# Patient Record
(demographics unavailable — no encounter records)

---

## 2024-10-15 NOTE — END OF VISIT
[Time Spent: ___ minutes] : I have spent [unfilled] minutes of time on the encounter which excludes teaching and separately reported services.
2 to 4 times a month

## 2024-10-18 NOTE — ASSESSMENT
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  Other high risk features such as LVI or PNI were not reported.  I had an extensive discussion about the role of adjuvant chemotherapy and stage II colon cancer I explained that at most adjuvant chemotherapy improve survival by about 2 to 4%.  He does have high risk clinical features granted these alone does not guarantee that there will be benefit from adjuvant chemotherapy  We went over the following options which include observation, adjuvant chemotherapy with capecitabine for 6 months, I explained the uncertain role of oxaliplatin and stage II colon cancer and that there is no definitive data that did improve survival, another option is discussed was a clinical trial NRG-.  Although this trial is currently not available in [place] it is available and multiple [facility] centers including [facility].  I also discussed the role of minimal residual disease testing with circulating tumor DNA analasis to guide therapy. N Engl J Med 2022;386:6800-1989 DOI: 10.1056/[id number] and the mentation of this technology was also discussed with patient  I went over the side effects of capecitabine in detail including but not limited to nausea, vomiting, rashes, diarrhea, cytopenia and severe toxicity such as mucositis and pancytopenia if his DPD deficiency.  After an extensive discussion he favored to start capecitabine for 6 months and also wanted to obtain [employer]ater[employer] testing if the result is positive we would escalate treatment and add oxaliplatin and complete 6 months of total treatment.  He started single agent Capecitabine  Plan -Capecitabine was ordered at a dose of 1000 mg/m twice a day day 1 through day 14 every 21 days, written information was provided, Zofran, Imodium and urea cream was provided and how to utilize it and side effect managements were discussed in detail -He has no complications with treatment and we will proceed with cycle 2 tomorrow and will see us back with cycle 3 -Blood work was done  -Signatera  After the visit actually came back and it was negative -Will need CT scan of the chest abdomen pelvis in approximately 1 year and repeat colonoscopy around the same time -CBC CMP with next visit will check CEA every 3 months

## 2024-10-18 NOTE — HISTORY OF PRESENT ILLNESS
[Disease: _____________________] : Disease: [unfilled] [T: ___] : T[unfilled] [N: ___] : N[unfilled] [M: ___] : M[unfilled] [AJCC Stage: ____] : AJCC Stage: [unfilled] [de-identified] : CC: I have colon cancer  He is here at the request of Dr. Aguilar  This is a 56 year old male with history of   HTN, HLD, cardiac catheterization, cardiomyopathy for reduced ejection fraction on echo with no acute findings, no need for stent placement, who presents at the recommendation of his PCP to [facility] ED on 8/19/2024  after an outpatient CT scan was done demonstrating an obstructing mass at the splenic flexure. Patient underwent CTAP due to GI discomfort for multiple months, not relieved with PRN GI medications. Patient states he has had intermittent constipation with decreased caliber of his stools.  He also had diarrhea and abdominal distension, upper abdomen. He follow with Dr. Martin for his PCP, GI Dr. Gray, Cardiology Dr. Mckeon.  He had CT C/A/P on 8/19/24in [facility]:  The patient's known constricting soft tissue mass is identified at level of the junction of the splenic flexure with the descending colon. There is marked dilatation of the cecum, ascending colon and transverse colon to the level of the obstructing lesion. The colon measures up to 11.5 cm in diameter. No CT evidence of metastasis.   He had a flex sig on 8/19/2024  with finding of descending colon mass s/p biopsy s/p tattoo distal to mass colonic stent placement. Biopsy showed:  Colon, splenic flexure, mass, biopsy: - Minute focus of high grade carcinoma, in a background of marked ulceration / necrosis       and fragments of colonic mucosa.  He had resection by Dr. Aguilar  /p robotic splenic flexure colectomy, Path: Final Diagnosis 1. Middle colic lymph node:  -  1 lymph node, negative for tumor.  2.  Middle colic the lymphadenectomy: -  12 lymph nodes, negative for tumor.  3.  Fourth portion of duodenum serosa: -  Fibrofatty tissue with mild chronic inflammation. -  Negative for tumor.  4. Splenic flexure colectomy: -  Poorly differentiated, ulcerated adenocarcinoma with mucin production. -  Tumor invades muscle and pericolic tissue, pT3. -  20 lymph nodes are free of tumor. -  All resection margins are negative for tumor. -  Calretinin stain is negative in tumor with rules out medullary carcinoma.   Immunohistochemical studies were performed at [facility], [address] [place] [address], [facility] (see NOTE).  The results are as follows:  Immunohistochemical stains have been performed using antibodies to the following mismatch repair proteins:  hMLH1 (clone M1)   Positive  hMSH2 (clone [id number])    Positive  hMSH6 (clone SP93)     Positive  PMS2 (clone A16-4)     Positive    Review of his blood work showed borderline mild anemia he had leukocytosis, CMP had some SVITLANA which resolved LFTs were normal his CA 19-9 and CEA were normal as well this is from August 20.  He is here for to discuss adjuvant options.     [de-identified] : Adenocarcinoma  [de-identified] : October 15, 2024 Guero is here for follow-up he started capecitabine and is due for cycle 2 tomorrow he has no complications CBC came back demonstrating mild anemia with hemoglobin 11.5 may be from chemotherapy but I ordered iron studies as well today

## 2024-10-18 NOTE — ASSESSMENT
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  Other high risk features such as LVI or PNI were not reported.  I had an extensive discussion about the role of adjuvant chemotherapy and stage II colon cancer I explained that at most adjuvant chemotherapy improve survival by about 2 to 4%.  He does have high risk clinical features granted these alone does not guarantee that there will be benefit from adjuvant chemotherapy  We went over the following options which include observation, adjuvant chemotherapy with capecitabine for 6 months, I explained the uncertain role of oxaliplatin and stage II colon cancer and that there is no definitive data that did improve survival, another option is discussed was a clinical trial NRG-.  Although this trial is currently not available in [place] it is available and multiple [facility] centers including [facility].  I also discussed the role of minimal residual disease testing with circulating tumor DNA analasis to guide therapy. N Engl J Med 2022;386:5022-2655 DOI: 10.1056/[id number] and the mentation of this technology was also discussed with patient  I went over the side effects of capecitabine in detail including but not limited to nausea, vomiting, rashes, diarrhea, cytopenia and severe toxicity such as mucositis and pancytopenia if his DPD deficiency.  After an extensive discussion he favored to start capecitabine for 6 months and also wanted to obtain [employer] testing if the result is positive we would escalate treatment and add oxaliplatin and complete 6 months of total treatment.  He started single agent Capecitabine  Plan -Capecitabine was ordered at a dose of 1000 mg/m twice a day day 1 through day 14 every 21 days, written information was provided, Zofran, Imodium and urea cream was provided and how to utilize it and side effect managements were discussed in detail -He has no complications with treatment and we will proceed with cycle 2 tomorrow and will see us back with cycle 3 -Blood work was done  -Signatera  After the visit actually came back and it was negative -Will need CT scan of the chest abdomen pelvis in approximately 1 year and repeat colonoscopy around the same time -CBC CMP with next visit will check CEA every 3 months

## 2024-10-18 NOTE — PHYSICAL EXAM
[Fully active, able to carry on all pre-disease performance without restriction] : Status 0 - Fully active, able to carry on all pre-disease performance without restriction [Normal] : affect appropriate [de-identified] : He has well-healed recent surgical scars abdomen was soft no evidence of infection

## 2024-10-18 NOTE — ASSESSMENT
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  Other high risk features such as LVI or PNI were not reported.  I had an extensive discussion about the role of adjuvant chemotherapy and stage II colon cancer I explained that at most adjuvant chemotherapy improve survival by about 2 to 4%.  He does have high risk clinical features granted these alone does not guarantee that there will be benefit from adjuvant chemotherapy  We went over the following options which include observation, adjuvant chemotherapy with capecitabine for 6 months, I explained the uncertain role of oxaliplatin and stage II colon cancer and that there is no definitive data that did improve survival, another option is discussed was a clinical trial NRG-.  Although this trial is currently not available in [place] it is available and multiple [facility] centers including [facility].  I also discussed the role of minimal residual disease testing with circulating tumor DNA analasis to guide therapy. N Engl J Med 2022;386:9316-5894 DOI: 10.1056/[id number] and the mentation of this technology was also discussed with patient  I went over the side effects of capecitabine in detail including but not limited to nausea, vomiting, rashes, diarrhea, cytopenia and severe toxicity such as mucositis and pancytopenia if his DPD deficiency.  After an extensive discussion he favored to start capecitabine for 6 months and also wanted to obtain [employer] testing if the result is positive we would escalate treatment and add oxaliplatin and complete 6 months of total treatment.  He started single agent Capecitabine  Plan -Capecitabine was ordered at a dose of 1000 mg/m twice a day day 1 through day 14 every 21 days, written information was provided, Zofran, Imodium and urea cream was provided and how to utilize it and side effect managements were discussed in detail -He has no complications with treatment and we will proceed with cycle 2 tomorrow and will see us back with cycle 3 -Blood work was done  -Signatera  After the visit actually came back and it was negative -Will need CT scan of the chest abdomen pelvis in approximately 1 year and repeat colonoscopy around the same time -CBC CMP with next visit will check CEA every 3 months

## 2024-10-18 NOTE — PHYSICAL EXAM
[Fully active, able to carry on all pre-disease performance without restriction] : Status 0 - Fully active, able to carry on all pre-disease performance without restriction [Normal] : affect appropriate [de-identified] : He has well-healed recent surgical scars abdomen was soft no evidence of infection

## 2024-10-18 NOTE — ASSESSMENT
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  Other high risk features such as LVI or PNI were not reported.  I had an extensive discussion about the role of adjuvant chemotherapy and stage II colon cancer I explained that at most adjuvant chemotherapy improve survival by about 2 to 4%.  He does have high risk clinical features granted these alone does not guarantee that there will be benefit from adjuvant chemotherapy  We went over the following options which include observation, adjuvant chemotherapy with capecitabine for 6 months, I explained the uncertain role of oxaliplatin and stage II colon cancer and that there is no definitive data that did improve survival, another option is discussed was a clinical trial NRG-.  Although this trial is currently not available in [place] it is available and multiple [facility] centers including [facility].  I also discussed the role of minimal residual disease testing with circulating tumor DNA analasis to guide therapy. N Engl J Med 2022;386:4393-3609 DOI: 10.1056/[id number] and the mentation of this technology was also discussed with patient  I went over the side effects of capecitabine in detail including but not limited to nausea, vomiting, rashes, diarrhea, cytopenia and severe toxicity such as mucositis and pancytopenia if his DPD deficiency.  After an extensive discussion he favored to start capecitabine for 6 months and also wanted to obtain [employer] testing if the result is positive we would escalate treatment and add oxaliplatin and complete 6 months of total treatment.  He started single agent Capecitabine  Plan -Capecitabine was ordered at a dose of 1000 mg/m twice a day day 1 through day 14 every 21 days, written information was provided, Zofran, Imodium and urea cream was provided and how to utilize it and side effect managements were discussed in detail -He has no complications with treatment and we will proceed with cycle 2 tomorrow and will see us back with cycle 3 -Blood work was done  -Signatera  After the visit actually came back and it was negative -Will need CT scan of the chest abdomen pelvis in approximately 1 year and repeat colonoscopy around the same time -CBC CMP with next visit will check CEA every 3 months

## 2024-10-18 NOTE — BEGINNING OF VISIT
[0] : 2) Feeling down, depressed, or hopeless: Not at all (0) [PHQ-2 Negative] : PHQ-2 Negative [Pain Scale: ___] : On a scale of 1-10, today the patient's pain is a(n) [unfilled]. [Never] : Never [Date Discussed (MM/DD/YY): ___] : Discussed: [unfilled] [With Patient/Caregiver] : with Patient/Caregiver [Diarrhea Character] : Diarrhea: Grade 0

## 2024-10-18 NOTE — PHYSICAL EXAM
[Fully active, able to carry on all pre-disease performance without restriction] : Status 0 - Fully active, able to carry on all pre-disease performance without restriction [Normal] : affect appropriate [de-identified] : He has well-healed recent surgical scars abdomen was soft no evidence of infection

## 2024-10-18 NOTE — HISTORY OF PRESENT ILLNESS
[Disease: _____________________] : Disease: [unfilled] [T: ___] : T[unfilled] [N: ___] : N[unfilled] [M: ___] : M[unfilled] [AJCC Stage: ____] : AJCC Stage: [unfilled] [de-identified] : CC: I have colon cancer  He is here at the request of Dr. Aguilar  This is a 56 year old male with history of   HTN, HLD, cardiac catheterization, cardiomyopathy for reduced ejection fraction on echo with no acute findings, no need for stent placement, who presents at the recommendation of his PCP to [facility] ED on 8/19/2024  after an outpatient CT scan was done demonstrating an obstructing mass at the splenic flexure. Patient underwent CTAP due to GI discomfort for multiple months, not relieved with PRN GI medications. Patient states he has had intermittent constipation with decreased caliber of his stools.  He also had diarrhea and abdominal distension, upper abdomen. He follow with Dr. Martin for his PCP, GI Dr. Gray, Cardiology Dr. Mckeon.  He had CT C/A/P on 8/19/24in [facility]:  The patient's known constricting soft tissue mass is identified at level of the junction of the splenic flexure with the descending colon. There is marked dilatation of the cecum, ascending colon and transverse colon to the level of the obstructing lesion. The colon measures up to 11.5 cm in diameter. No CT evidence of metastasis.   He had a flex sig on 8/19/2024  with finding of descending colon mass s/p biopsy s/p tattoo distal to mass colonic stent placement. Biopsy showed:  Colon, splenic flexure, mass, biopsy: - Minute focus of high grade carcinoma, in a background of marked ulceration / necrosis       and fragments of colonic mucosa.  He had resection by Dr. Aguilar  /p robotic splenic flexure colectomy, Path: Final Diagnosis 1. Middle colic lymph node:  -  1 lymph node, negative for tumor.  2.  Middle colic the lymphadenectomy: -  12 lymph nodes, negative for tumor.  3.  Fourth portion of duodenum serosa: -  Fibrofatty tissue with mild chronic inflammation. -  Negative for tumor.  4. Splenic flexure colectomy: -  Poorly differentiated, ulcerated adenocarcinoma with mucin production. -  Tumor invades muscle and pericolic tissue, pT3. -  20 lymph nodes are free of tumor. -  All resection margins are negative for tumor. -  Calretinin stain is negative in tumor with rules out medullary carcinoma.   Immunohistochemical studies were performed at [facility], [address] [place] [address], [facility] (see NOTE).  The results are as follows:  Immunohistochemical stains have been performed using antibodies to the following mismatch repair proteins:  hMLH1 (clone M1)   Positive  hMSH2 (clone [id number])    Positive  hMSH6 (clone SP93)     Positive  PMS2 (clone A16-4)     Positive    Review of his blood work showed borderline mild anemia he had leukocytosis, CMP had some SVITLANA which resolved LFTs were normal his CA 19-9 and CEA were normal as well this is from August 20.  He is here for to discuss adjuvant options.     [de-identified] : Adenocarcinoma  [de-identified] : October 15, 2024 Guero is here for follow-up he started capecitabine and is due for cycle 2 tomorrow he has no complications CBC came back demonstrating mild anemia with hemoglobin 11.5 may be from chemotherapy but I ordered iron studies as well today

## 2024-10-18 NOTE — HISTORY OF PRESENT ILLNESS
[Disease: _____________________] : Disease: [unfilled] [T: ___] : T[unfilled] [N: ___] : N[unfilled] [M: ___] : M[unfilled] [AJCC Stage: ____] : AJCC Stage: [unfilled] [de-identified] : CC: I have colon cancer  He is here at the request of Dr. Aguilar  This is a 56 year old male with history of   HTN, HLD, cardiac catheterization, cardiomyopathy for reduced ejection fraction on echo with no acute findings, no need for stent placement, who presents at the recommendation of his PCP to [facility] ED on 8/19/2024  after an outpatient CT scan was done demonstrating an obstructing mass at the splenic flexure. Patient underwent CTAP due to GI discomfort for multiple months, not relieved with PRN GI medications. Patient states he has had intermittent constipation with decreased caliber of his stools.  He also had diarrhea and abdominal distension, upper abdomen. He follow with Dr. Martin for his PCP, GI Dr. Gray, Cardiology Dr. Mckeon.  He had CT C/A[facility]P on 8/19/[facility]:  The patient's known constricting soft tissue mass is identified at level of the junction of the splenic flexure with the descending colon. There is marked dilatation of the cecum, ascending colon and transverse colon to the level of the obstructing lesion. The colon measures up to 11.5 cm in diameter. No CT evidence of metastasis.   He had a flex sig on 8/19/2024  with finding of descending colon mass s/p biopsy s/p tattoo distal to mass colonic stent placement. Biopsy showed:  Colon, splenic flexure, mass, biopsy: - Minute focus of high grade carcinoma, in a background of marked ulceration / necrosis       and fragments of colonic mucosa.  He had resection by Dr. Aguilar  /p robotic splenic flexure colectomy, Path: Final Diagnosis 1. Middle colic lymph node:  -  1 lymph node, negative for tumor.  2.  Middle colic the lymphadenectomy: -  12 lymph nodes, negative for tumor.  3.  Fourth portion of duodenum serosa: -  Fibrofatty tissue with mild chronic inflammation. -  Negative for tumor.  4. Splenic flexure colectomy: -  Poorly differentiated, ulcerated adenocarcinoma with mucin production. -  Tumor invades muscle and pericolic tissue, pT3. -  20 lymph nodes are free of tumor. -  All resection margins are negative for tumor. -  Calretinin stain is negative in tumor with rules out medullary carcinoma.   Immunohistochemical studies were performed at [facility], [address] [place] [address], [facility] (see NOTE).  The results are as follows:  Immunohistochemical stains have been performed using antibodies to the following mismatch repair proteins:  hMLH1 (clone M1)   Positive  hMSH2 (clone [id number])    Positive  hMSH6 (clone SP93)     Positive  PMS2 (clone A16-4)     Positive    Review of his blood work showed borderline mild anemia he had leukocytosis, CMP had some SVITLANA which resolved LFTs were normal his CA 19-9 and CEA were normal as well this is from August 20.  He is here for to discuss adjuvant options.     [de-identified] : Adenocarcinoma  [de-identified] : October 15, 2024 Guero is here for follow-up he started capecitabine and is due for cycle 2 tomorrow he has no complications CBC came back demonstrating mild anemia with hemoglobin 11.5 may be from chemotherapy but I ordered iron studies as well today

## 2024-10-18 NOTE — PHYSICAL EXAM
[Fully active, able to carry on all pre-disease performance without restriction] : Status 0 - Fully active, able to carry on all pre-disease performance without restriction [Normal] : affect appropriate [de-identified] : He has well-healed recent surgical scars abdomen was soft no evidence of infection

## 2024-10-18 NOTE — HISTORY OF PRESENT ILLNESS
[Disease: _____________________] : Disease: [unfilled] [T: ___] : T[unfilled] [N: ___] : N[unfilled] [M: ___] : M[unfilled] [AJCC Stage: ____] : AJCC Stage: [unfilled] [de-identified] : CC: I have colon cancer  He is here at the request of Dr. Aguilar  This is a 56 year old male with history of   HTN, HLD, cardiac catheterization, cardiomyopathy for reduced ejection fraction on echo with no acute findings, no need for stent placement, who presents at the recommendation of his PCP to [facility] ED on 8/19/2024  after an outpatient CT scan was done demonstrating an obstructing mass at the splenic flexure. Patient underwent CTAP due to GI discomfort for multiple months, not relieved with PRN GI medications. Patient states he has had intermittent constipation with decreased caliber of his stools.  He also had diarrhea and abdominal distension, upper abdomen. He follow with Dr. Martin for his PCP, GI Dr. Gray, Cardiology Dr. Mckeon.  He had CT C/A/P on 8/19/24in [facility]:  The patient's known constricting soft tissue mass is identified at level of the junction of the splenic flexure with the descending colon. There is marked dilatation of the cecum, ascending colon and transverse colon to the level of the obstructing lesion. The colon measures up to 11.5 cm in diameter. No CT evidence of metastasis.   He had a flex sig on 8/19/2024  with finding of descending colon mass s/p biopsy s/p tattoo distal to mass colonic stent placement. Biopsy showed:  Colon, splenic flexure, mass, biopsy: - Minute focus of high grade carcinoma, in a background of marked ulceration / necrosis       and fragments of colonic mucosa.  He had resection by Dr. Aguilar  /p robotic splenic flexure colectomy, Path: Final Diagnosis 1. Middle colic lymph node:  -  1 lymph node, negative for tumor.  2.  Middle colic the lymphadenectomy: -  12 lymph nodes, negative for tumor.  3.  Fourth portion of duodenum serosa: -  Fibrofatty tissue with mild chronic inflammation. -  Negative for tumor.  4. Splenic flexure colectomy: -  Poorly differentiated, ulcerated adenocarcinoma with mucin production. -  Tumor invades muscle and pericolic tissue, pT3. -  20 lymph nodes are free of tumor. -  All resection margins are negative for tumor. -  Calretinin stain is negative in tumor with rules out medullary carcinoma.   Immunohistochemical studies were performed at [facility], [address] [place] [address], [facility] (see NOTE).  The results are as follows:  Immunohistochemical stains have been performed using antibodies to the following mismatch repair proteins:  hMLH1 (clone M1)   Positive  hMSH2 (clone [id number])    Positive  hMSH6 (clone SP93)     Positive  PMS2 (clone A16-4)     Positive    Review of his blood work showed borderline mild anemia he had leukocytosis, CMP had some SVITLANA which resolved LFTs were normal his CA 19-9 and CEA were normal as well this is from August 20.  He is here for to discuss adjuvant options.     [de-identified] : Adenocarcinoma  [de-identified] : October 15, 2024 Guero is here for follow-up he started capecitabine and is due for cycle 2 tomorrow he has no complications CBC came back demonstrating mild anemia with hemoglobin 11.5 may be from chemotherapy but I ordered iron studies as well today

## 2024-11-15 NOTE — PHYSICAL EXAM
[Fully active, able to carry on all pre-disease performance without restriction] : Status 0 - Fully active, able to carry on all pre-disease performance without restriction [Normal] : affect appropriate [de-identified] : well-healed recent surgical scars abdomen was soft no evidence of infection

## 2024-11-15 NOTE — END OF VISIT
[FreeTextEntry3] : I was physically present for the key portions of the evaluation and management service provided.  I agree with the history and physical, and plan which I have reviewed and edited where appropriate.  Guero is here for follow-up he is on adjuvant capecitabine for stage II colon cancer he is doing well his postop Signatera was negative we will continue to monitor blood work will check CEA every 3 months he is to complete 8 cycles and is due for cycle 4 soon

## 2024-11-15 NOTE — HISTORY OF PRESENT ILLNESS
[Disease: _____________________] : Disease: [unfilled] [T: ___] : T[unfilled] [N: ___] : N[unfilled] [M: ___] : M[unfilled] [AJCC Stage: ____] : AJCC Stage: [unfilled] [de-identified] : CC: I have colon cancer  He is here at the request of Dr. Aguilar  This is a 56-year-old male with history of HTN, HLD, cardiac catheterization, cardiomyopathy for reduced ejection fraction on echo with no acute findings, no need for stent placement, who presents at the recommendation of his PCP to the ED on 8/19/2024 after an outpatient CT scan was done demonstrating an obstructing mass at the splenic flexure. Patient underwent CTAP due to GI discomfort for multiple months, not relieved with PRN GI medications. Patient states he has had intermittent constipation with decreased caliber of his stools. He also had diarrhea and abdominal distension, upper abdomen. He follows with Dr. Martin for his PCP, GI Dr. Gray, Cardiology Dr. Mckeon.  He had CT C/A/P on 8/19/24in [facility]: The patient's known constricting soft tissue mass is identified at level of the junction of the splenic flexure with the descending colon. There is marked dilatation of the cecum, ascending colon and transverse colon to the level of the obstructing lesion. The colon measures up to 11.5 cm in diameter. No CT evidence of metastasis.  He had a flex sig on 8/19/2024 with finding of descending colon mass s/p biopsy s/p tattoo distal to mass colonic stent placement.  Biopsy showed: Colon, splenic flexure, mass, biopsy: - Minute focus of high-grade carcinoma, in a background of marked ulceration / necrosis and fragments of colonic mucosa.  He had resection by Dr. Aguilar S/P robotic splenic flexure colectomy, Path: Final Diagnosis 1. Middle colic lymph node: -  1 lymph node, negative for tumor. 2.  Middle colic the lymphadenectomy: -  12 lymph nodes, negative for tumor. 3.  Fourth portion of duodenum serosa: -  Fibrofatty tissue with mild chronic inflammation. -  Negative for tumor. 4. Splenic flexure colectomy: -  Poorly differentiated, ulcerated adenocarcinoma with mucin production. -  Tumor invades muscle and pericolic tissue, pT3. -  20 lymph nodes are free of tumor. -  All resection margins are negative for tumor. -  Calretinin stain is negative in tumor with rules out medullary carcinoma.  Immunohistochemical studies were performed at [facility], [address] [place] [address], [id number] (see NOTE). Immunohistochemical stains have been performed using antibodies to the following mismatch repair proteins: hMLH1 (clone M1)   Positive hMSH2 (clone [id number])    Positive hMSH6 (clone SP93)     Positive PMS2 (clone A16-4)     Positive  Review of his blood work showed borderline mild anemia he had leukocytosis, CMP had some SVITLANA which resolved LFTs were normal his CA 19-9 and CEA were normal as well this is from August 20.  He is here for to discuss adjuvant options. [de-identified] : Adenocarcinoma  [de-identified] : October 15, 2024 Guero is here for follow-up he started capecitabine and is due for cycle 2 tomorrow he has no complications CBC came back demonstrating mild anemia with hemoglobin 11.5 may be from chemotherapy but I ordered iron studies as well today.  11/12/2024 He is here today for follow-up and reports no significant changes since the last visit.  He remains at the base of his health status.

## 2024-11-15 NOTE — PHYSICAL EXAM
[Fully active, able to carry on all pre-disease performance without restriction] : Status 0 - Fully active, able to carry on all pre-disease performance without restriction [Normal] : affect appropriate [de-identified] : well-healed recent surgical scars abdomen was soft no evidence of infection

## 2024-11-15 NOTE — PHYSICAL EXAM
[Fully active, able to carry on all pre-disease performance without restriction] : Status 0 - Fully active, able to carry on all pre-disease performance without restriction [Normal] : affect appropriate [de-identified] : well-healed recent surgical scars abdomen was soft no evidence of infection

## 2024-11-15 NOTE — ASSESSMENT
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 - 20 lymph nodes removed the tumor was poorly differentiated as well as there was evidence of obstruction. - Other high-risk features such as LVI or PNI were not reported. - I had an extensive discussion about the role of adjuvant chemotherapy and stage II colon cancer I explained that at most adjuvant chemotherapy improve survival by about 2 to 4%. He does have high risk clinical features granted these alone does not guarantee that there will be benefit from adjuvant chemotherapy. - We went over the following options which include observation, adjuvant chemotherapy with capecitabine for 6 months, I explained the uncertain role of oxaliplatin and stage II colon cancer and that there is no definitive data that did improve survival, another option is discussed was a clinical trial NRG-. Although this trial is currently not available in [place] it is available and multiple [facility] centers including [facility]. I also discussed the role of minimal residual disease testing with circulating tumor DNA analysis to guide therapy. N Engl J Med 2022;386:5764-8843 DOI: 10.1056/ACBYqg0142760 and the mentation of this technology was also discussed with patient. - I went over the side effects of capecitabine in detail including but not limited to nausea, vomiting, rashes, diarrhea, cytopenia and severe toxicity such as mucositis and pancytopenia if his DPD deficiency. - After an extensive discussion he favored to start capecitabine for 6 months and also wanted to obtain Signatera testing if the result is positive we would escalate treatment and add oxaliplatin and complete 6 months of total treatment. - Signatera  was negative. - 09/18/2024 started single agent Capecitabine 1000 mg/m2 BID D1-14 Q21D for total of 6 months.  11/12/2024 Labs reviewed and results discussed with the patient; He reports that high potassium was also noted by PCP at yesterday's blood work.  He is aware and wants to follow with PCP recommendations to avoid potassium rich food and has follow-up with PCP to manage hyperkalemia - remains clinically stable to continue current management. All questions were answered to satisfaction.  PLAN: - continue Capecitabine 1000 mg/m2 BID D1-14 Q21D for total of 6 months - he is on C3W2 today. - continue Zofran, Imodium and urea cream PRN. - repeat CT scan of the chest abdomen pelvis in approximately 1 year and repeat colonoscopy around the same time - 09/2025. - F/U with PCP to manage hyperkalemia. - Labs today: CBC CMP CEA every 3 months RTC 11/26/2024 with CBC CMP

## 2024-11-15 NOTE — ASSESSMENT
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 - 20 lymph nodes removed the tumor was poorly differentiated as well as there was evidence of obstruction. - Other high-risk features such as LVI or PNI were not reported. - I had an extensive discussion about the role of adjuvant chemotherapy and stage II colon cancer I explained that at most adjuvant chemotherapy improve survival by about 2 to 4%. He does have high risk clinical features granted these alone does not guarantee that there will be benefit from adjuvant chemotherapy. - We went over the following options which include observation, adjuvant chemotherapy with capecitabine for 6 months, I explained the uncertain role of oxaliplatin and stage II colon cancer and that there is no definitive data that did improve survival, another option is discussed was a clinical trial NRG-. Although this trial is currently not available in [place] it is available and multiple [facility] centers including [facility]. I also discussed the role of minimal residual disease testing with circulating tumor DNA analysis to guide therapy. N Engl J Med 2022;386:3793-9689 DOI: 10.1056/PMXIqv3163817 and the mentation of this technology was also discussed with patient. - I went over the side effects of capecitabine in detail including but not limited to nausea, vomiting, rashes, diarrhea, cytopenia and severe toxicity such as mucositis and pancytopenia if his DPD deficiency. - After an extensive discussion he favored to start capecitabine for 6 months and also wanted to obtain Signatera testing if the result is positive we would escalate treatment and add oxaliplatin and complete 6 months of total treatment. - Signatera  was negative. - 09/18/2024 started single agent Capecitabine 1000 mg/m2 BID D1-14 Q21D for total of 6 months.  11/12/2024 Labs reviewed and results discussed with the patient; He reports that high potassium was also noted by PCP at yesterday's blood work.  He is aware and wants to follow with PCP recommendations to avoid potassium rich food and has follow-up with PCP to manage hyperkalemia - remains clinically stable to continue current management. All questions were answered to satisfaction.  PLAN: - continue Capecitabine 1000 mg/m2 BID D1-14 Q21D for total of 6 months - he is on C3W2 today. - continue Zofran, Imodium and urea cream PRN. - repeat CT scan of the chest abdomen pelvis in approximately 1 year and repeat colonoscopy around the same time - 09/2025. - F/U with PCP to manage hyperkalemia. - Labs today: CBC CMP CEA every 3 months RTC 11/26/2024 with CBC CMP

## 2024-11-15 NOTE — HISTORY OF PRESENT ILLNESS
[Disease: _____________________] : Disease: [unfilled] [T: ___] : T[unfilled] [N: ___] : N[unfilled] [M: ___] : M[unfilled] [AJCC Stage: ____] : AJCC Stage: [unfilled] [de-identified] : CC: I have colon cancer  He is here at the request of Dr. Aguilar  This is a 56-year-old male with history of HTN, HLD, cardiac catheterization, cardiomyopathy for reduced ejection fraction on echo with no acute findings, no need for stent placement, who presents at the recommendation of his PCP to the ED on 8/19/2024 after an outpatient CT scan was done demonstrating an obstructing mass at the splenic flexure. Patient underwent CTAP due to GI discomfort for multiple months, not relieved with PRN GI medications. Patient states he has had intermittent constipation with decreased caliber of his stools. He also had diarrhea and abdominal distension, upper abdomen. He follows with Dr. Martin for his PCP, GI Dr. Gray, Cardiology Dr. Mckeon.  He had CT C/A/P on 8/19/24in [facility]: The patient's known constricting soft tissue mass is identified at level of the junction of the splenic flexure with the descending colon. There is marked dilatation of the cecum, ascending colon and transverse colon to the level of the obstructing lesion. The colon measures up to 11.5 cm in diameter. No CT evidence of metastasis.  He had a flex sig on 8/19/2024 with finding of descending colon mass s/p biopsy s/p tattoo distal to mass colonic stent placement.  Biopsy showed: Colon, splenic flexure, mass, biopsy: - Minute focus of high-grade carcinoma, in a background of marked ulceration / necrosis and fragments of colonic mucosa.  He had resection by Dr. Aguilar S/P robotic splenic flexure colectomy, Path: Final Diagnosis 1. Middle colic lymph node: -  1 lymph node, negative for tumor. 2.  Middle colic the lymphadenectomy: -  12 lymph nodes, negative for tumor. 3.  Fourth portion of duodenum serosa: -  Fibrofatty tissue with mild chronic inflammation. -  Negative for tumor. 4. Splenic flexure colectomy: -  Poorly differentiated, ulcerated adenocarcinoma with mucin production. -  Tumor invades muscle and pericolic tissue, pT3. -  20 lymph nodes are free of tumor. -  All resection margins are negative for tumor. -  Calretinin stain is negative in tumor with rules out medullary carcinoma.  Immunohistochemical studies were performed at [facility], [address] [place] [address], [id number] (see NOTE). Immunohistochemical stains have been performed using antibodies to the following mismatch repair proteins: hMLH1 (clone M1)   Positive hMSH2 (clone [id number])    Positive hMSH6 (clone SP93)     Positive PMS2 (clone A16-4)     Positive  Review of his blood work showed borderline mild anemia he had leukocytosis, CMP had some SVITLANA which resolved LFTs were normal his CA 19-9 and CEA were normal as well this is from August 20.  He is here for to discuss adjuvant options. [de-identified] : Adenocarcinoma  [de-identified] : October 15, 2024 Guero is here for follow-up he started capecitabine and is due for cycle 2 tomorrow he has no complications CBC came back demonstrating mild anemia with hemoglobin 11.5 may be from chemotherapy but I ordered iron studies as well today.  11/12/2024 He is here today for follow-up and reports no significant changes since the last visit.  He remains at the base of his health status.

## 2024-11-15 NOTE — HISTORY OF PRESENT ILLNESS
[Disease: _____________________] : Disease: [unfilled] [T: ___] : T[unfilled] [N: ___] : N[unfilled] [M: ___] : M[unfilled] [AJCC Stage: ____] : AJCC Stage: [unfilled] [de-identified] : CC: I have colon cancer  He is here at the request of Dr. Aguilar  This is a 56-year-old male with history of HTN, HLD, cardiac catheterization, cardiomyopathy for reduced ejection fraction on echo with no acute findings, no need for stent placement, who presents at the recommendation of his PCP to the ED on 8/19/2024 after an outpatient CT scan was done demonstrating an obstructing mass at the splenic flexure. Patient underwent CTAP due to GI discomfort for multiple months, not relieved with PRN GI medications. Patient states he has had intermittent constipation with decreased caliber of his stools. He also had diarrhea and abdominal distension, upper abdomen. He follows with Dr. Martin for his PCP, GI Dr. Gray, Cardiology Dr. Mckeon.  He had CT C/A/P on 8/19/24in [facility]: The patient's known constricting soft tissue mass is identified at level of the junction of the splenic flexure with the descending colon. There is marked dilatation of the cecum, ascending colon and transverse colon to the level of the obstructing lesion. The colon measures up to 11.5 cm in diameter. No CT evidence of metastasis.  He had a flex sig on 8/19/2024 with finding of descending colon mass s/p biopsy s/p tattoo distal to mass colonic stent placement.  Biopsy showed: Colon, splenic flexure, mass, biopsy: - Minute focus of high-grade carcinoma, in a background of marked ulceration / necrosis and fragments of colonic mucosa.  He had resection by Dr. Aguilar S/P robotic splenic flexure colectomy, Path: Final Diagnosis 1. Middle colic lymph node: -  1 lymph node, negative for tumor. 2.  Middle colic the lymphadenectomy: -  12 lymph nodes, negative for tumor. 3.  Fourth portion of duodenum serosa: -  Fibrofatty tissue with mild chronic inflammation. -  Negative for tumor. 4. Splenic flexure colectomy: -  Poorly differentiated, ulcerated adenocarcinoma with mucin production. -  Tumor invades muscle and pericolic tissue, pT3. -  20 lymph nodes are free of tumor. -  All resection margins are negative for tumor. -  Calretinin stain is negative in tumor with rules out medullary carcinoma.  Immunohistochemical studies were performed at [facility], [address] [place] [address], [id number] (see NOTE). Immunohistochemical stains have been performed using antibodies to the following mismatch repair proteins: hMLH1 (clone M1)   Positive hMSH2 (clone [id number])    Positive hMSH6 (clone SP93)     Positive PMS2 (clone A16-4)     Positive  Review of his blood work showed borderline mild anemia he had leukocytosis, CMP had some SVITLANA which resolved LFTs were normal his CA 19-9 and CEA were normal as well this is from August 20.  He is here for to discuss adjuvant options. [de-identified] : Adenocarcinoma  [de-identified] : October 15, 2024 Guero is here for follow-up he started capecitabine and is due for cycle 2 tomorrow he has no complications CBC came back demonstrating mild anemia with hemoglobin 11.5 may be from chemotherapy but I ordered iron studies as well today.  11/12/2024 He is here today for follow-up and reports no significant changes since the last visit.  He remains at the base of his health status.

## 2024-11-15 NOTE — ASSESSMENT
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 - 20 lymph nodes removed the tumor was poorly differentiated as well as there was evidence of obstruction. - Other high-risk features such as LVI or PNI were not reported. - I had an extensive discussion about the role of adjuvant chemotherapy and stage II colon cancer I explained that at most adjuvant chemotherapy improve survival by about 2 to 4%. He does have high risk clinical features granted these alone does not guarantee that there will be benefit from adjuvant chemotherapy. - We went over the following options which include observation, adjuvant chemotherapy with capecitabine for 6 months, I explained the uncertain role of oxaliplatin and stage II colon cancer and that there is no definitive data that did improve survival, another option is discussed was a clinical trial NRG-. Although this trial is currently not available in [place] it is available and multiple [facility] centers including [facility]. I also discussed the role of minimal residual disease testing with circulating tumor DNA analysis to guide therapy. N Engl J Med 2022;386:7219-4449 DOI: 10.1056/BTJCwr1287007 and the mentation of this technology was also discussed with patient. - I went over the side effects of capecitabine in detail including but not limited to nausea, vomiting, rashes, diarrhea, cytopenia and severe toxicity such as mucositis and pancytopenia if his DPD deficiency. - After an extensive discussion he favored to start capecitabine for 6 months and also wanted to obtain Signatera testing if the result is positive we would escalate treatment and add oxaliplatin and complete 6 months of total treatment. - Signatera  was negative. - 09/18/2024 started single agent Capecitabine 1000 mg/m2 BID D1-14 Q21D for total of 6 months.  11/12/2024 Labs reviewed and results discussed with the patient; He reports that high potassium was also noted by PCP at yesterday's blood work.  He is aware and wants to follow with PCP recommendations to avoid potassium rich food and has follow-up with PCP to manage hyperkalemia - remains clinically stable to continue current management. All questions were answered to satisfaction.  PLAN: - continue Capecitabine 1000 mg/m2 BID D1-14 Q21D for total of 6 months - he is on C3W2 today. - continue Zofran, Imodium and urea cream PRN. - repeat CT scan of the chest abdomen pelvis in approximately 1 year and repeat colonoscopy around the same time - 09/2025. - F/U with PCP to manage hyperkalemia. - Labs today: CBC CMP CEA every 3 months RTC 11/26/2024 with CBC CMP

## 2024-11-29 NOTE — PHYSICAL EXAM
[Fully active, able to carry on all pre-disease performance without restriction] : Status 0 - Fully active, able to carry on all pre-disease performance without restriction [Normal] : RRR, normal S1S2, no murmurs, rubs, gallops [de-identified] : well-healed recent surgical scars abdomen was soft no evidence of infection

## 2024-11-29 NOTE — ASSESSMENT
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 - 20 lymph nodes removed the tumor was poorly differentiated as well as there was evidence of obstruction. - Other high-risk features such as LVI or PNI were not reported. - I had an extensive discussion about the role of adjuvant chemotherapy and stage II colon cancer I explained that at most adjuvant chemotherapy improve survival by about 2 to 4%. He does have high risk clinical features granted these alone does not guarantee that there will be benefit from adjuvant chemotherapy. - We went over the following options which include observation, adjuvant chemotherapy with capecitabine for 6 months, I explained the uncertain role of oxaliplatin and stage II colon cancer and that there is no definitive data that did improve survival, another option is discussed was a clinical trial NRG-. Although this trial is currently not available in [place] it is available and multiple [facility] centers including [facility]. I also discussed the role of minimal residual disease testing with circulating tumor DNA analysis to guide therapy. N Engl J Med 2022;386:8461-8450 DOI: 10.1056/XMKMdn0485108 and the mentation of this technology was also discussed with patient. - I went over the side effects of capecitabine in detail including but not limited to nausea, vomiting, rashes, diarrhea, cytopenia and severe toxicity such as mucositis and pancytopenia if his DPD deficiency. - After an extensive discussion he favored to start capecitabine for 6 months and also wanted to obtain Signatera testing if the result is positive, we would escalate treatment and add oxaliplatin and complete 6 months of total treatment. - Signatera was negative. - 09/18/2024 started single agent Capecitabine 1000 mg/m2 BID D1-14 Q21D for total of 6 months.  11/26/2024 Labs reviewed and results discussed with the patient; tearing eyes is one of the SE of Xeloda, and also the patient does not have adequate oral hydration - we discussed the importance to adhere to adequate oral hydration and he promised to increase oral hydration; he will continue off K and Fe - remains clinically stable to continue current management. All questions were answered to satisfaction.  PLAN: - continue Capecitabine 1000 mg/m2 BID D1-14 Q21D for total of 6 months - C4 starts tomorrow 11/27/2024. - increase oral hydration. - continue Zofran, Imodium and urea cream PRN. - repeat CT scan of the chest abdomen pelvis in approximately 1 year and repeat colonoscopy around the same time - 09/2025. - F/U with PCP to manage hyperkalemia. - F/U with ophthalmology for tearing eyes to r/o other causes of tearing eyes. - Labs today: CBC CMP CEA every 3 months RTC in 3 weeks with CBC CMP

## 2024-11-29 NOTE — PHYSICAL EXAM
[Fully active, able to carry on all pre-disease performance without restriction] : Status 0 - Fully active, able to carry on all pre-disease performance without restriction [Normal] : RRR, normal S1S2, no murmurs, rubs, gallops [de-identified] : well-healed recent surgical scars abdomen was soft no evidence of infection

## 2024-11-29 NOTE — HISTORY OF PRESENT ILLNESS
[Disease: _____________________] : Disease: [unfilled] [T: ___] : T[unfilled] [N: ___] : N[unfilled] [M: ___] : M[unfilled] [AJCC Stage: ____] : AJCC Stage: [unfilled] [de-identified] : CC: I have colon cancer  He is here at the request of Dr. Aguilar  This is a 56-year-old male with history of HTN, HLD, cardiac catheterization, cardiomyopathy for reduced ejection fraction on echo with no acute findings, no need for stent placement, who presents at the recommendation of his PCP to the ED on 8/19/2024 after an outpatient CT scan was done demonstrating an obstructing mass at the splenic flexure. Patient underwent CTAP due to GI discomfort for multiple months, not relieved with PRN GI medications. Patient states he has had intermittent constipation with decreased caliber of his stools. He also had diarrhea and abdominal distension, upper abdomen. He follows with Dr. Martin for his PCP, GI Dr. Gray, Cardiology Dr. Mckeon.  He had CT C/A/P on 8/19/24in [facility]: The patient's known constricting soft tissue mass is identified at level of the junction of the splenic flexure with the descending colon. There is marked dilatation of the cecum, ascending colon and transverse colon to the level of the obstructing lesion. The colon measures up to 11.5 cm in diameter. No CT evidence of metastasis.  He had a flex sig on 8/19/2024 with finding of descending colon mass s/p biopsy s/p tattoo distal to mass colonic stent placement.  Biopsy showed: Colon, splenic flexure, mass, biopsy: - Minute focus of high-grade carcinoma, in a background of marked ulceration / necrosis and fragments of colonic mucosa.  He had resection by Dr. Aguilar S/P robotic splenic flexure colectomy, Path: Final Diagnosis 1. Middle colic lymph node: -  1 lymph node, negative for tumor. 2.  Middle colic the lymphadenectomy: -  12 lymph nodes, negative for tumor. 3.  Fourth portion of duodenum serosa: -  Fibrofatty tissue with mild chronic inflammation. -  Negative for tumor. 4. Splenic flexure colectomy: -  Poorly differentiated, ulcerated adenocarcinoma with mucin production. -  Tumor invades muscle and pericolic tissue, pT3. -  20 lymph nodes are free of tumor. -  All resection margins are negative for tumor. -  Calretinin stain is negative in tumor with rules out medullary carcinoma.  Immunohistochemical studies were performed at [facility], [address] [place] [address], [id number] (see NOTE). Immunohistochemical stains have been performed using antibodies to the following mismatch repair proteins: hMLH1 (clone M1)   Positive hMSH2 (clone [id number])    Positive hMSH6 (clone [id number])     Positive PMS2 (clone [id number])     Positive  Review of his blood work showed borderline mild anemia he had leukocytosis, CMP had some SVITLANA which resolved LFTs were normal his CA 19-9 and CEA were normal as well this is from August 20.  He is here for to discuss adjuvant options. [de-identified] : Adenocarcinoma  [de-identified] : October 15, 2024 Guero is here for follow-up he started capecitabine and is due for cycle 2 tomorrow he has no complications CBC came back demonstrating mild anemia with hemoglobin 11.5 may be from chemotherapy but I ordered iron studies as well today.  11/12/2024 he is on the week off - reports inadequate oral hydration and "tearing eyes".  11/26/2024 accompanied by ; Reports feeling well at the baseline of his health status, no changes in chronic conditions or new complaints since the last visit.

## 2024-11-29 NOTE — REVIEW OF SYSTEMS
[Diarrhea: Grade 0] : Diarrhea: Grade 0 [Negative] : Allergic/Immunologic [Fatigue] : fatigue [FreeTextEntry3] : tearing eyes

## 2024-11-29 NOTE — END OF VISIT
[FreeTextEntry3] : I was physically present for the key portions of the evaluation and management service provided.  I agree with the history and physical, and plan which I have reviewed and edited where appropriate.  Guero is here for follow-up he is on adjuvant treatment for stage II colon cancer he is due for cycle 4 of 8 of capecitabine he is doing well blood work was done continue with surveillance with lab work and CTs and colonoscopies as above.  He could see us back in 3 weeks

## 2024-11-29 NOTE — HISTORY OF PRESENT ILLNESS
[Disease: _____________________] : Disease: [unfilled] [T: ___] : T[unfilled] [N: ___] : N[unfilled] [M: ___] : M[unfilled] [AJCC Stage: ____] : AJCC Stage: [unfilled] [de-identified] : CC: I have colon cancer  He is here at the request of Dr. Aguilar  This is a 56-year-old male with history of HTN, HLD, cardiac catheterization, cardiomyopathy for reduced ejection fraction on echo with no acute findings, no need for stent placement, who presents at the recommendation of his PCP to the ED on 8/19/2024 after an outpatient CT scan was done demonstrating an obstructing mass at the splenic flexure. Patient underwent CTAP due to GI discomfort for multiple months, not relieved with PRN GI medications. Patient states he has had intermittent constipation with decreased caliber of his stools. He also had diarrhea and abdominal distension, upper abdomen. He follows with Dr. Martin for his PCP, GI Dr. Gray, Cardiology Dr. Mckeon.  He had CT C/A/P on 8/19/24in [facility]: The patient's known constricting soft tissue mass is identified at level of the junction of the splenic flexure with the descending colon. There is marked dilatation of the cecum, ascending colon and transverse colon to the level of the obstructing lesion. The colon measures up to 11.5 cm in diameter. No CT evidence of metastasis.  He had a flex sig on 8/19/2024 with finding of descending colon mass s/p biopsy s/p tattoo distal to mass colonic stent placement.  Biopsy showed: Colon, splenic flexure, mass, biopsy: - Minute focus of high-grade carcinoma, in a background of marked ulceration / necrosis and fragments of colonic mucosa.  He had resection by Dr. Aguilar S/P robotic splenic flexure colectomy, Path: Final Diagnosis 1. Middle colic lymph node: -  1 lymph node, negative for tumor. 2.  Middle colic the lymphadenectomy: -  12 lymph nodes, negative for tumor. 3.  Fourth portion of duodenum serosa: -  Fibrofatty tissue with mild chronic inflammation. -  Negative for tumor. 4. Splenic flexure colectomy: -  Poorly differentiated, ulcerated adenocarcinoma with mucin production. -  Tumor invades muscle and pericolic tissue, pT3. -  20 lymph nodes are free of tumor. -  All resection margins are negative for tumor. -  Calretinin stain is negative in tumor with rules out medullary carcinoma.  Immunohistochemical studies were performed at [facility], [address] [place] [address], [id number] (see NOTE). Immunohistochemical stains have been performed using antibodies to the following mismatch repair proteins: hMLH1 (clone M1)   Positive hMSH2 (clone [id number])    Positive hMSH6 (clone [id number])     Positive PMS2 (clone A16-4)     Positive  Review of his blood work showed borderline mild anemia he had leukocytosis, CMP had some SVITLANA which resolved LFTs were normal his CA 19-9 and CEA were normal as well this is from August 20.  He is here for to discuss adjuvant options. [de-identified] : Adenocarcinoma  [de-identified] : October 15, 2024 Guero is here for follow-up he started capecitabine and is due for cycle 2 tomorrow he has no complications CBC came back demonstrating mild anemia with hemoglobin 11.5 may be from chemotherapy but I ordered iron studies as well today.  11/12/2024 he is on the week off - reports inadequate oral hydration and "tearing eyes".  11/26/2024 accompanied by ; Reports feeling well at the baseline of his health status, no changes in chronic conditions or new complaints since the last visit.

## 2024-11-29 NOTE — ASSESSMENT
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 - 20 lymph nodes removed the tumor was poorly differentiated as well as there was evidence of obstruction. - Other high-risk features such as LVI or PNI were not reported. - I had an extensive discussion about the role of adjuvant chemotherapy and stage II colon cancer I explained that at most adjuvant chemotherapy improve survival by about 2 to 4%. He does have high risk clinical features granted these alone does not guarantee that there will be benefit from adjuvant chemotherapy. - We went over the following options which include observation, adjuvant chemotherapy with capecitabine for 6 months, I explained the uncertain role of oxaliplatin and stage II colon cancer and that there is no definitive data that did improve survival, another option is discussed was a clinical trial NRG-. Although this trial is currently not available in [place] it is available and multiple [facility] centers including [facility]. I also discussed the role of minimal residual disease testing with circulating tumor DNA analysis to guide therapy. N Engl J Med 2022;386:1044-0801 DOI: 10.1056/CMAJen6401949 and the mentation of this technology was also discussed with patient. - I went over the side effects of capecitabine in detail including but not limited to nausea, vomiting, rashes, diarrhea, cytopenia and severe toxicity such as mucositis and pancytopenia if his DPD deficiency. - After an extensive discussion he favored to start capecitabine for 6 months and also wanted to obtain Signatera testing if the result is positive, we would escalate treatment and add oxaliplatin and complete 6 months of total treatment. - Signatera was negative. - 09/18/2024 started single agent Capecitabine 1000 mg/m2 BID D1-14 Q21D for total of 6 months.  11/26/2024 Labs reviewed and results discussed with the patient; tearing eyes is one of the SE of Xeloda, and also the patient does not have adequate oral hydration - we discussed the importance to adhere to adequate oral hydration and he promised to increase oral hydration; he will continue off K and Fe - remains clinically stable to continue current management. All questions were answered to satisfaction.  PLAN: - continue Capecitabine 1000 mg/m2 BID D1-14 Q21D for total of 6 months - C4 starts tomorrow 11/27/2024. - increase oral hydration. - continue Zofran, Imodium and urea cream PRN. - repeat CT scan of the chest abdomen pelvis in approximately 1 year and repeat colonoscopy around the same time - 09/2025. - F/U with PCP to manage hyperkalemia. - F/U with ophthalmology for tearing eyes to r/o other causes of tearing eyes. - Labs today: CBC CMP CEA every 3 months RTC in 3 weeks with CBC CMP

## 2024-12-12 NOTE — HISTORY OF PRESENT ILLNESS
[de-identified] : SALONI TITUS  is a pleasant 56 year old man  who came in 08/22/2024 for spelnic flexure obstructing lesion s/p stent couple days ago~  ~  . He has Dr EFRAIN SOTO as His PCP.  he reported multiple BM and less distension plan for colectomy next week no pain

## 2024-12-12 NOTE — ASSESSMENT
[FreeTextEntry1] : SALONI TITUS  is a pleasant 56 year old man  who came in 08/22/2024 for spelnic flexure obstructing lesion s/p stent couple days ago~  ~  . He has Dr EFRAIN SOTO as His PCP.  he reported multiple BM and less distension plan for colectomy next week no pain  robotic possible open partial colectomy   09/12/2024 : s/p robotic splenic flexure colectomy, path is [id number], sending for med onc, will see in two months  12/10/2024 : some nausea and vomiting and bloating, he is currently on chemo, exam is unchanged,  will send for ct a/p  the above plan of care with discussed in details to the patient and all questions were answered to patient satisfaction. patient instructed to follow up with the referring physician and patient primary care provider   A total of 35 minutes was spent on this visit, obtaining h/p,  reviewing previous notes/imaging, counseling the patient on adjuvant therapy and f/u , ordering tests (below), and documenting the findings in the note.

## 2024-12-16 NOTE — REASON FOR VISIT
[Consultation] : a consultation visit [Spouse] : spouse [FreeTextEntry1] : NP - Ref by Dr. Martin for Neoplasm of Colon

## 2024-12-16 NOTE — PHYSICAL EXAM
[Bowel Sounds] : normal bowel sounds [Abdomen Tenderness] : non-tender [Abdomen Soft] : soft [No Masses] : no abdominal mass palpated [de-identified] : diastasis recti

## 2024-12-16 NOTE — ASSESSMENT
[FreeTextEntry1] : 56yoM pmhx: HTN, HLD, colon cancer (s/p partial colectomy, oral chemo). Presents today for f/u   CRC Screening -Will schedule colonoscopy -Risks vs. benefits discussed -Educated on golytely prep and dulcolax

## 2024-12-19 NOTE — HISTORY OF PRESENT ILLNESS
[Disease: _____________________] : Disease: [unfilled] [T: ___] : T[unfilled] [N: ___] : N[unfilled] [M: ___] : M[unfilled] [AJCC Stage: ____] : AJCC Stage: [unfilled] [de-identified] : CC: I have colon cancer  He is here at the request of Dr. Aguilar  This is a 56-year-old male with history of HTN, HLD, cardiac catheterization, cardiomyopathy for reduced ejection fraction on echo with no acute findings, no need for stent placement, who presents at the recommendation of his PCP to the ED on 8/19/2024 after an outpatient CT scan was done demonstrating an obstructing mass at the splenic flexure. Patient underwent CTAP due to GI discomfort for multiple months, not relieved with PRN GI medications. Patient states he has had intermittent constipation with decreased caliber of his stools. He also had diarrhea and abdominal distension, upper abdomen. He follows with Dr. Martin for his PCP, GI Dr. Gray, Cardiology Dr. Mckeon.  He had CT C/A/P on 8/19/24in [facility]: The patient's known constricting soft tissue mass is identified at level of the junction of the splenic flexure with the descending colon. There is marked dilatation of the cecum, ascending colon and transverse colon to the level of the obstructing lesion. The colon measures up to 11.5 cm in diameter. No CT evidence of metastasis.  He had a flex sig on 8/19/2024 with finding of descending colon mass s/p biopsy s/p tattoo distal to mass colonic stent placement.  Biopsy showed: Colon, splenic flexure, mass, biopsy: - Minute focus of high-grade carcinoma, in a background of marked ulceration / necrosis and fragments of colonic mucosa.  He had resection by Dr. Aguilar S/P robotic splenic flexure colectomy, Path: Final Diagnosis 1. Middle colic lymph node: -  1 lymph node, negative for tumor. 2.  Middle colic the lymphadenectomy: -  12 lymph nodes, negative for tumor. 3.  Fourth portion of duodenum serosa: -  Fibrofatty tissue with mild chronic inflammation. -  Negative for tumor. 4. Splenic flexure colectomy: -  Poorly differentiated, ulcerated adenocarcinoma with mucin production. -  Tumor invades muscle and pericolic tissue, pT3. -  20 lymph nodes are free of tumor. -  All resection margins are negative for tumor. -  Calretinin stain is negative in tumor with rules out medullary carcinoma.  Immunohistochemical studies were performed at [facility], [address] [place] [address], [id number] (see NOTE). Immunohistochemical stains have been performed using antibodies to the following mismatch repair proteins: hMLH1 (clone M1)   Positive hMSH2 (clone [id number])    Positive hMSH6 (clone [id number])     Positive PMS2 (clone A16-4)     Positive  Review of his blood work showed borderline mild anemia he had leukocytosis, CMP had some SVITLANA which resolved LFTs were normal his CA 19-9 and CEA were normal as well this is from August 20.  He is here for to discuss adjuvant options. [de-identified] : Adenocarcinoma  [de-identified] : October 15, 2024 Guero is here for follow-up he started capecitabine and is due for cycle 2 tomorrow he has no complications CBC came back demonstrating mild anemia with hemoglobin 11.5 may be from chemotherapy but I ordered iron studies as well today.  11/12/2024 he is on the week off - reports inadequate oral hydration and "tearing eyes".  11/26/2024 Reports feeling well at the baseline of his health status, no changes in chronic conditions or new complaints since the last visit.  12/17/2024 He is here for follow-up and reports no new complaints. He still has problems with cracking of his fingertips and palms, but this is his chronic condition that happens even before we started the treatment. He uses Eucerin cream and it seems helps a lot. But his condition worsens since the temperature is getting to wintertime.

## 2024-12-19 NOTE — REVIEW OF SYSTEMS
[Fatigue] : fatigue [Negative] : Allergic/Immunologic [FreeTextEntry3] : tearing eyes - improved. [de-identified] : Chronic cracking at his fingertips and palms bilateral.

## 2024-12-19 NOTE — PHYSICAL EXAM
[Fully active, able to carry on all pre-disease performance without restriction] : Status 0 - Fully active, able to carry on all pre-disease performance without restriction [Normal] : affect appropriate [de-identified] : well-healed recent surgical scars abdomen was soft no evidence of infection. [de-identified] : Chronic cracking at his fingertips and palms bilateral.

## 2024-12-19 NOTE — REVIEW OF SYSTEMS
[Fatigue] : fatigue [Negative] : Allergic/Immunologic [FreeTextEntry3] : tearing eyes - improved. [de-identified] : Chronic cracking at his fingertips and palms bilateral.

## 2024-12-19 NOTE — REVIEW OF SYSTEMS
[Fatigue] : fatigue [Negative] : Allergic/Immunologic [FreeTextEntry3] : tearing eyes - improved. [de-identified] : Chronic cracking at his fingertips and palms bilateral.

## 2024-12-19 NOTE — END OF VISIT
[FreeTextEntry3] : I was physically present for the key portions of the evaluation and management service provided.  I agree with the history and physical, and plan which I have reviewed and edited where appropriate.  He is here for follow-up he is due to start cycle 3.  PET CT scan was recently done which showing some improvement Deauville score was not provided.  I explained to the patient this protocol allows treatment for total of 4-6 cycles the PET scans are now indeterminant so like to repeat a PET scan after cycle 4 and then decide to continue with treatment or maybe possibly radiate the area although this was not a part of the protocol  Also he is finally feeling toxicity from treatment which would not surprise so we decided to decrease the dose essentially by 20% except brentuximab which went down to 0.8 mg/kg  He will see us back in 2 weeks he has a repeat echo pending which are monitoring given his cardiac history

## 2024-12-19 NOTE — ASSESSMENT
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 - 20 lymph nodes removed the tumor was poorly differentiated as well as there was evidence of obstruction. - Other high-risk features such as LVI or PNI were not reported. - I had an extensive discussion about the role of adjuvant chemotherapy and stage II colon cancer I explained that at most adjuvant chemotherapy improve survival by about 2 to 4%. He does have high risk clinical features granted these alone does not guarantee that there will be benefit from adjuvant chemotherapy. - We went over the following options which include observation, adjuvant chemotherapy with capecitabine for 6 months, I explained the uncertain role of oxaliplatin and stage II colon cancer and that there is no definitive data that did improve survival, another option is discussed was a clinical trial NRG-. Although this trial is currently not available in [place] it is available and multiple [facility] centers including [facility]. I also discussed the role of minimal residual disease testing with circulating tumor DNA analysis to guide therapy. N Engl J Med 2022;386:2096-0030 DOI: 10.1056/[id number] and the mentation of this technology was also discussed with patient. - I went over the side effects of capecitabine in detail including but not limited to nausea, vomiting, rashes, diarrhea, cytopenia and severe toxicity such as mucositis and pancytopenia if his DPD deficiency. - After an extensive discussion he favored to start capecitabine for 6 months and also wanted to obtain Signatera testing if the result is positive, we would escalate treatment and add oxaliplatin and complete 6 months of total treatment. - Signatera was negative. - 09/18/2024 started single agent Capecitabine 1000 mg/m2 = 2000 mg BID D1-14 Q21D for total of 6 months. - 12/17/2024 Capecitabine dose decreased to 1500 mg PO D1-14 Q21D due exacerbation of hand-foot syndrome.  12/17/2024 Labs reviewed and results discussed with the patient; It seems that increased hydration improved the tearing of his eyes that he mentioned at his last visit; He mentioned today that cracking or his fingertips palms bilateral are increased with weather change to wintertime. This also could be one of the side effects of Xeloda, and therefore we will decrease the dose to 1500 mg p.o. twice daily 14/21D. All questions were answered to satisfaction.  PLAN: - decrease Capecitabine 1500 mg PO BID D1-14 Q21D for total of 6 months - C5 starts tomorrow 12/18/2024. - continue increased oral hydration. - continue Zofran, Imodium and urea cream PRN. - repeat CT scan of the chest abdomen pelvis in approximately 1 year and repeat colonoscopy around the same time - 09/2025 - CT ordered by Dr. Aguilar on 01/06/2025. - scheduled for colonoscopy - just does not know when. - F/U with PCP to manage hyperkalemia. - F/U with ophthalmology for tearing eyes to r/o other causes of tearing eyes. - Labs today: CBC CMP  - repeat CEA every 3 months - 02/2025. RTC in 3 weeks with CBC CMP ferritin iron studies

## 2024-12-19 NOTE — ASSESSMENT
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 - 20 lymph nodes removed the tumor was poorly differentiated as well as there was evidence of obstruction. - Other high-risk features such as LVI or PNI were not reported. - I had an extensive discussion about the role of adjuvant chemotherapy and stage II colon cancer I explained that at most adjuvant chemotherapy improve survival by about 2 to 4%. He does have high risk clinical features granted these alone does not guarantee that there will be benefit from adjuvant chemotherapy. - We went over the following options which include observation, adjuvant chemotherapy with capecitabine for 6 months, I explained the uncertain role of oxaliplatin and stage II colon cancer and that there is no definitive data that did improve survival, another option is discussed was a clinical trial NRG-. Although this trial is currently not available in [place] it is available and multiple [facility] centers including [facility]. I also discussed the role of minimal residual disease testing with circulating tumor DNA analysis to guide therapy. N Engl J Med 2022;386:0242-1449 DOI: 10.1056/[id number] and the mentation of this technology was also discussed with patient. - I went over the side effects of capecitabine in detail including but not limited to nausea, vomiting, rashes, diarrhea, cytopenia and severe toxicity such as mucositis and pancytopenia if his DPD deficiency. - After an extensive discussion he favored to start capecitabine for 6 months and also wanted to obtain Signatera testing if the result is positive, we would escalate treatment and add oxaliplatin and complete 6 months of total treatment. - Signatera was negative. - 09/18/2024 started single agent Capecitabine 1000 mg/m2 = 2000 mg BID D1-14 Q21D for total of 6 months. - 12/17/2024 Capecitabine dose decreased to 1500 mg PO D1-14 Q21D due exacerbation of hand-foot syndrome.  12/17/2024 Labs reviewed and results discussed with the patient; It seems that increased hydration improved the tearing of his eyes that he mentioned at his last visit; He mentioned today that cracking or his fingertips palms bilateral are increased with weather change to wintertime. This also could be one of the side effects of Xeloda, and therefore we will decrease the dose to 1500 mg p.o. twice daily 14/21D. All questions were answered to satisfaction.  PLAN: - decrease Capecitabine 1500 mg PO BID D1-14 Q21D for total of 6 months - C5 starts tomorrow 12/18/2024. - continue increased oral hydration. - continue Zofran, Imodium and urea cream PRN. - repeat CT scan of the chest abdomen pelvis in approximately 1 year and repeat colonoscopy around the same time - 09/2025 - CT ordered by Dr. Aguilar on 01/06/2025. - scheduled for colonoscopy - just does not know when. - F/U with PCP to manage hyperkalemia. - F/U with ophthalmology for tearing eyes to r/o other causes of tearing eyes. - Labs today: CBC CMP  - repeat CEA every 3 months - 02/2025. RTC in 3 weeks with CBC CMP ferritin iron studies

## 2024-12-19 NOTE — HISTORY OF PRESENT ILLNESS
[Disease: _____________________] : Disease: [unfilled] [T: ___] : T[unfilled] [N: ___] : N[unfilled] [M: ___] : M[unfilled] [AJCC Stage: ____] : AJCC Stage: [unfilled] [de-identified] : CC: I have colon cancer  He is here at the request of Dr. Aguilar  This is a 56-year-old male with history of HTN, HLD, cardiac catheterization, cardiomyopathy for reduced ejection fraction on echo with no acute findings, no need for stent placement, who presents at the recommendation of his PCP to the ED on 8/19/2024 after an outpatient CT scan was done demonstrating an obstructing mass at the splenic flexure. Patient underwent CTAP due to GI discomfort for multiple months, not relieved with PRN GI medications. Patient states he has had intermittent constipation with decreased caliber of his stools. He also had diarrhea and abdominal distension, upper abdomen. He follows with Dr. Martin for his PCP, GI Dr. Gary, Cardiology Dr. Mckeon.  He had CT C/A/P on 8/19/24in [facility]: The patient's known constricting soft tissue mass is identified at level of the junction of the splenic flexure with the descending colon. There is marked dilatation of the cecum, ascending colon and transverse colon to the level of the obstructing lesion. The colon measures up to 11.5 cm in diameter. No CT evidence of metastasis.  He had a flex sig on 8/19/2024 with finding of descending colon mass s/p biopsy s/p tattoo distal to mass colonic stent placement.  Biopsy showed: Colon, splenic flexure, mass, biopsy: - Minute focus of high-grade carcinoma, in a background of marked ulceration / necrosis and fragments of colonic mucosa.  He had resection by Dr. Aguilar S/P robotic splenic flexure colectomy, Path: Final Diagnosis 1. Middle colic lymph node: -  1 lymph node, negative for tumor. 2.  Middle colic the lymphadenectomy: -  12 lymph nodes, negative for tumor. 3.  Fourth portion of duodenum serosa: -  Fibrofatty tissue with mild chronic inflammation. -  Negative for tumor. 4. Splenic flexure colectomy: -  Poorly differentiated, ulcerated adenocarcinoma with mucin production. -  Tumor invades muscle and pericolic tissue, pT3. -  20 lymph nodes are free of tumor. -  All resection margins are negative for tumor. -  Calretinin stain is negative in tumor with rules out medullary carcinoma.  Immunohistochemical studies were performed at [facility], [address] [place] [address], [id number] (see NOTE). Immunohistochemical stains have been performed using antibodies to the following mismatch repair proteins: hMLH1 (clone M1)   Positive hMSH2 (clone [id number])    Positive hMSH6 (clone [id number])     Positive PMS2 (clone [id number])     Positive  Review of his blood work showed borderline mild anemia he had leukocytosis, CMP had some SVITLANA which resolved LFTs were normal his CA 19-9 and CEA were normal as well this is from August 20.  He is here for to discuss adjuvant options. [de-identified] : Adenocarcinoma  [de-identified] : October 15, 2024 Guero is here for follow-up he started capecitabine and is due for cycle 2 tomorrow he has no complications CBC came back demonstrating mild anemia with hemoglobin 11.5 may be from chemotherapy but I ordered iron studies as well today.  11/12/2024 he is on the week off - reports inadequate oral hydration and "tearing eyes".  11/26/2024 Reports feeling well at the baseline of his health status, no changes in chronic conditions or new complaints since the last visit.  12/17/2024 He is here for follow-up and reports no new complaints. He still has problems with cracking of his fingertips and palms, but this is his chronic condition that happens even before we started the treatment. He uses Eucerin cream and it seems helps a lot. But his condition worsens since the temperature is getting to wintertime.

## 2024-12-19 NOTE — HISTORY OF PRESENT ILLNESS
[Disease: _____________________] : Disease: [unfilled] [T: ___] : T[unfilled] [N: ___] : N[unfilled] [M: ___] : M[unfilled] [AJCC Stage: ____] : AJCC Stage: [unfilled] [de-identified] : CC: I have colon cancer  He is here at the request of Dr. Aguilar  This is a 56-year-old male with history of HTN, HLD, cardiac catheterization, cardiomyopathy for reduced ejection fraction on echo with no acute findings, no need for stent placement, who presents at the recommendation of his PCP to the ED on 8/19/2024 after an outpatient CT scan was done demonstrating an obstructing mass at the splenic flexure. Patient underwent CTAP due to GI discomfort for multiple months, not relieved with PRN GI medications. Patient states he has had intermittent constipation with decreased caliber of his stools. He also had diarrhea and abdominal distension, upper abdomen. He follows with Dr. Martin for his PCP, GI Dr. Gray, Cardiology Dr. Mckeon.  He had CT C/A[facility]P on 8/19/24[facility]: The patient's known constricting soft tissue mass is identified at level of the junction of the splenic flexure with the descending colon. There is marked dilatation of the cecum, ascending colon and transverse colon to the level of the obstructing lesion. The colon measures up to 11.5 cm in diameter. No CT evidence of metastasis.  He had a flex sig on 8/19/2024 with finding of descending colon mass s/p biopsy s/p tattoo distal to mass colonic stent placement.  Biopsy showed: Colon, splenic flexure, mass, biopsy: - Minute focus of high-grade carcinoma, in a background of marked ulceration / necrosis and fragments of colonic mucosa.  He had resection by Dr. Aguilar S/P robotic splenic flexure colectomy, Path: Final Diagnosis 1. Middle colic lymph node: -  1 lymph node, negative for tumor. 2.  Middle colic the lymphadenectomy: -  12 lymph nodes, negative for tumor. 3.  Fourth portion of duodenum serosa: -  Fibrofatty tissue with mild chronic inflammation. -  Negative for tumor. 4. Splenic flexure colectomy: -  Poorly differentiated, ulcerated adenocarcinoma with mucin production. -  Tumor invades muscle and pericolic tissue, pT3. -  20 lymph nodes are free of tumor. -  All resection margins are negative for tumor. -  Calretinin stain is negative in tumor with rules out medullary carcinoma.  Immunohistochemical studies were performed at [facility], [address] [place] [address], [address] (see NOTE). Immunohistochemical stains have been performed using antibodies to the following mismatch repair proteins: hMLH1 (clone M1)   Positive hMSH2 (clone [id number])    Positive hMSH6 (clone SP93)     Positive PMS2 (clone [id number])     Positive  Review of his blood work showed borderline mild anemia he had leukocytosis, CMP had some SVITLANA which resolved LFTs were normal his CA 19-9 and CEA were normal as well this is from August 20.  He is here for to discuss adjuvant options. [de-identified] : Adenocarcinoma  [de-identified] : October 15, 2024 Guero is here for follow-up he started capecitabine and is due for cycle 2 tomorrow he has no complications CBC came back demonstrating mild anemia with hemoglobin 11.5 may be from chemotherapy but I ordered iron studies as well today.  11/12/2024 he is on the week off - reports inadequate oral hydration and "tearing eyes".  11/26/2024 Reports feeling well at the baseline of his health status, no changes in chronic conditions or new complaints since the last visit.  12/17/2024 He is here for follow-up and reports no new complaints. He still has problems with cracking of his fingertips and palms, but this is his chronic condition that happens even before we started the treatment. He uses Eucerin cream and it seems helps a lot. But his condition worsens since the temperature is getting to wintertime.

## 2024-12-19 NOTE — PHYSICAL EXAM
[Fully active, able to carry on all pre-disease performance without restriction] : Status 0 - Fully active, able to carry on all pre-disease performance without restriction [Normal] : affect appropriate [de-identified] : well-healed recent surgical scars abdomen was soft no evidence of infection. [de-identified] : Chronic cracking at his fingertips and palms bilateral.

## 2024-12-19 NOTE — ASSESSMENT
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 - 20 lymph nodes removed the tumor was poorly differentiated as well as there was evidence of obstruction. - Other high-risk features such as LVI or PNI were not reported. - I had an extensive discussion about the role of adjuvant chemotherapy and stage II colon cancer I explained that at most adjuvant chemotherapy improve survival by about 2 to 4%. He does have high risk clinical features granted these alone does not guarantee that there will be benefit from adjuvant chemotherapy. - We went over the following options which include observation, adjuvant chemotherapy with capecitabine for 6 months, I explained the uncertain role of oxaliplatin and stage II colon cancer and that there is no definitive data that did improve survival, another option is discussed was a clinical trial NRG-. Although this trial is currently not available in [place] it is available and multiple [facility] centers including [facility]. I also discussed the role of minimal residual disease testing with circulating tumor DNA analysis to guide therapy. N Engl J Med 2022;386:6010-9305 DOI: 10.1056/[id number] and the mentation of this technology was also discussed with patient. - I went over the side effects of capecitabine in detail including but not limited to nausea, vomiting, rashes, diarrhea, cytopenia and severe toxicity such as mucositis and pancytopenia if his DPD deficiency. - After an extensive discussion he favored to start capecitabine for 6 months and also wanted to obtain Signatera testing if the result is positive, we would escalate treatment and add oxaliplatin and complete 6 months of total treatment. - Signatera was negative. - 09/18/2024 started single agent Capecitabine 1000 mg/m2 = 2000 mg BID D1-14 Q21D for total of 6 months. - 12/17/2024 Capecitabine dose decreased to 1500 mg PO D1-14 Q21D due exacerbation of hand-foot syndrome.  12/17/2024 Labs reviewed and results discussed with the patient; It seems that increased hydration improved the tearing of his eyes that he mentioned at his last visit; He mentioned today that cracking or his fingertips palms bilateral are increased with weather change to wintertime. This also could be one of the side effects of Xeloda, and therefore we will decrease the dose to 1500 mg p.o. twice daily 14/21D. All questions were answered to satisfaction.  PLAN: - decrease Capecitabine 1500 mg PO BID D1-14 Q21D for total of 6 months - C5 starts tomorrow 12/18/2024. - continue increased oral hydration. - continue Zofran, Imodium and urea cream PRN. - repeat CT scan of the chest abdomen pelvis in approximately 1 year and repeat colonoscopy around the same time - 09/2025 - CT ordered by Dr. Aguilar on 01/06/2025. - scheduled for colonoscopy - just does not know when. - F/U with PCP to manage hyperkalemia. - F/U with ophthalmology for tearing eyes to r/o other causes of tearing eyes. - Labs today: CBC CMP  - repeat CEA every 3 months - 02/2025. RTC in 3 weeks with CBC CMP ferritin iron studies

## 2024-12-19 NOTE — PHYSICAL EXAM
[Fully active, able to carry on all pre-disease performance without restriction] : Status 0 - Fully active, able to carry on all pre-disease performance without restriction [Normal] : affect appropriate [de-identified] : well-healed recent surgical scars abdomen was soft no evidence of infection. [de-identified] : Chronic cracking at his fingertips and palms bilateral.

## 2025-01-11 NOTE — ASSESSMENT
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 - 20 lymph nodes removed the tumor was poorly differentiated as well as there was evidence of obstruction. - Other high-risk features such as LVI or PNI were not reported. - I had an extensive discussion about the role of adjuvant chemotherapy and stage II colon cancer I explained that at most adjuvant chemotherapy improve survival by about 2 to 4%. He does have high risk clinical features granted these alone does not guarantee that there will be benefit from adjuvant chemotherapy. - We went over the following options which include observation, adjuvant chemotherapy with capecitabine for 6 months, I explained the uncertain role of oxaliplatin and stage II colon cancer and that there is no definitive data that did improve survival, another option is discussed was a clinical trial NRG-. Although this trial is currently not available in [place] it is available and multiple [facility] centers including [facility]. I also discussed the role of minimal residual disease testing with circulating tumor DNA analysis to guide therapy. N Engl J Med 2022;386:0685-0666 DOI: 10.1056/[id number] and the mentation of this technology was also discussed with patient. - I went over the side effects of capecitabine in detail including but not limited to nausea, vomiting, rashes, diarrhea, cytopenia and severe toxicity such as mucositis and pancytopenia if his DPD deficiency. - After an extensive discussion he favored to start capecitabine for 6 months and also wanted to obtain Signatera testing if the result is positive, we would escalate treatment and add oxaliplatin and complete 6 months of total treatment. - Signatera was negative. - 09/18/2024 started single agent Capecitabine 1000 mg/m2 = 2000 mg BID D1-14 Q21D for total of 6 months. - 12/17/2024 Capecitabine dose decreased to 1500 mg PO D1-14 Q21D due exacerbation of hand-foot syndrome. - CT C/A/P 12/31/24: ÁNGEL  PLAN: - c/w dose reduced Capecitabine 1500 mg PO BID D1-14 Q21D for total of 6 months - Cycle 6 started yesterday 1/8/25. Keep the same dose.  - continue Zofran, Imodium and urea cream PRN. - repeat CT scan of the chest abdomen pelvis in approximately 1 year 12/2025 and repeat colonoscopy around 09/2025 - F/U with ophthalmology for tearing eyes to r/o other causes of tearing eyes. - Labs today: CBC CMP  - repeat CEA every 3 months - 02/2025.  RTC in 3 weeks with CBC CMP ferritin iron studies

## 2025-01-11 NOTE — END OF VISIT
[] : Fellow [FreeTextEntry3] : Guero is here for follow-up he is due for cycle 6 of 8 of adjuvant capecitabine he is doing well besides the skin on his hands which is somewhat dry but the chemotherapy is not making it worse so we will continue same dose although was dose reduced in the past has cream.  Surveillance as above see us back in 3 weeks

## 2025-01-11 NOTE — ASSESSMENT
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 - 20 lymph nodes removed the tumor was poorly differentiated as well as there was evidence of obstruction. - Other high-risk features such as LVI or PNI were not reported. - I had an extensive discussion about the role of adjuvant chemotherapy and stage II colon cancer I explained that at most adjuvant chemotherapy improve survival by about 2 to 4%. He does have high risk clinical features granted these alone does not guarantee that there will be benefit from adjuvant chemotherapy. - We went over the following options which include observation, adjuvant chemotherapy with capecitabine for 6 months, I explained the uncertain role of oxaliplatin and stage II colon cancer and that there is no definitive data that did improve survival, another option is discussed was a clinical trial NRG-. Although this trial is currently not available in [place] it is available and multiple [facility] centers including [facility]. I also discussed the role of minimal residual disease testing with circulating tumor DNA analysis to guide therapy. N Engl J Med 2022;386:0843-4195 DOI: 10.1056/[id number] and the mentation of this technology was also discussed with patient. - I went over the side effects of capecitabine in detail including but not limited to nausea, vomiting, rashes, diarrhea, cytopenia and severe toxicity such as mucositis and pancytopenia if his DPD deficiency. - After an extensive discussion he favored to start capecitabine for 6 months and also wanted to obtain Signatera testing if the result is positive, we would escalate treatment and add oxaliplatin and complete 6 months of total treatment. - Signatera was negative. - 09/18/2024 started single agent Capecitabine 1000 mg/m2 = 2000 mg BID D1-14 Q21D for total of 6 months. - 12/17/2024 Capecitabine dose decreased to 1500 mg PO D1-14 Q21D due exacerbation of hand-foot syndrome. - CT C/A/P 12/31/24: ÁNGEL  PLAN: - c/w dose reduced Capecitabine 1500 mg PO BID D1-14 Q21D for total of 6 months - Cycle 6 started yesterday 1/8/25. Keep the same dose.  - continue Zofran, Imodium and urea cream PRN. - repeat CT scan of the chest abdomen pelvis in approximately 1 year 12/2025 and repeat colonoscopy around 09/2025 - F/U with ophthalmology for tearing eyes to r/o other causes of tearing eyes. - Labs today: CBC CMP  - repeat CEA every 3 months - 02/2025.  RTC in 3 weeks with CBC CMP ferritin iron studies

## 2025-01-11 NOTE — PHYSICAL EXAM
[Fully active, able to carry on all pre-disease performance without restriction] : Status 0 - Fully active, able to carry on all pre-disease performance without restriction [Normal] : affect appropriate [de-identified] : well-healed recent surgical scars abdomen was soft no evidence of infection. [de-identified] : Chronic cracking at his fingertips and palms bilateral.

## 2025-01-11 NOTE — HISTORY OF PRESENT ILLNESS
[Disease: _____________________] : Disease: [unfilled] [T: ___] : T[unfilled] [N: ___] : N[unfilled] [M: ___] : M[unfilled] [AJCC Stage: ____] : AJCC Stage: [unfilled] [de-identified] : CC: I have colon cancer  He is here at the request of Dr. Aguilar  This is a 56-year-old male with history of HTN, HLD, cardiac catheterization, cardiomyopathy for reduced ejection fraction on echo with no acute findings, no need for stent placement, who presents at the recommendation of his PCP to the ED on 8/19/2024 after an outpatient CT scan was done demonstrating an obstructing mass at the splenic flexure. Patient underwent CTAP due to GI discomfort for multiple months, not relieved with PRN GI medications. Patient states he has had intermittent constipation with decreased caliber of his stools. He also had diarrhea and abdominal distension, upper abdomen. He follows with Dr. Martin for his PCP, GI Dr. Gray, Cardiology Dr. Mckeon.  He had CT C/A/P on 8/19/24in [facility]: The patient's known constricting soft tissue mass is identified at level of the junction of the splenic flexure with the descending colon. There is marked dilatation of the cecum, ascending colon and transverse colon to the level of the obstructing lesion. The colon measures up to 11.5 cm in diameter. No CT evidence of metastasis.  He had a flex sig on 8/19/2024 with finding of descending colon mass s/p biopsy s/p tattoo distal to mass colonic stent placement.  Biopsy showed: Colon, splenic flexure, mass, biopsy: - Minute focus of high-grade carcinoma, in a background of marked ulceration / necrosis and fragments of colonic mucosa.  He had resection by Dr. Aguilar S/P robotic splenic flexure colectomy, Path: Final Diagnosis 1. Middle colic lymph node: -  1 lymph node, negative for tumor. 2.  Middle colic the lymphadenectomy: -  12 lymph nodes, negative for tumor. 3.  Fourth portion of duodenum serosa: -  Fibrofatty tissue with mild chronic inflammation. -  Negative for tumor. 4. Splenic flexure colectomy: -  Poorly differentiated, ulcerated adenocarcinoma with mucin production. -  Tumor invades muscle and pericolic tissue, pT3. -  20 lymph nodes are free of tumor. -  All resection margins are negative for tumor. -  Calretinin stain is negative in tumor with rules out medullary carcinoma.  Immunohistochemical studies were performed at [facility], [address] [place] [address], [id number] (see NOTE). Immunohistochemical stains have been performed using antibodies to the following mismatch repair proteins: hMLH1 (clone M1)   Positive hMSH2 (clone [id number])    Positive hMSH6 (clone [id number])     Positive PMS2 (clone [id number])     Positive  Review of his blood work showed borderline mild anemia he had leukocytosis, CMP had some SVITLANA which resolved LFTs were normal his CA 19-9 and CEA were normal as well this is from August 20.  He is here for to discuss adjuvant options. [de-identified] : Adenocarcinoma  [de-identified] : October 15, 2024 Guero is here for follow-up he started capecitabine and is due for cycle 2 tomorrow he has no complications CBC came back demonstrating mild anemia with hemoglobin 11.5 may be from chemotherapy but I ordered iron studies as well today.  11/12/2024 he is on the week off - reports inadequate oral hydration and "tearing eyes".  11/26/2024 Reports feeling well at the baseline of his health status, no changes in chronic conditions or new complaints since the last visit.  12/17/2024 He is here for follow-up and reports no new complaints. He still has problems with cracking of his fingertips and palms, but this is his chronic condition that happens even before we started the treatment. He uses Eucerin cream and it seems helps a lot. But his condition worsens since the temperature is getting to wintertime.  1/9/25: Eduardo is here for the follow up. He is on Xeloda for stage II Colon cancer. he is tolerating treatment well. Today is his D2 of cycle. we reduced the dose to 1500 mg BD last time, and reports he feels much better now. His  reports "pain in his hands". 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 His weight is stable. Counselled patient on using urea cream more regularly. Denies any abdominal pain, blood in stools, headaches, blurred vision, dizziness. He had CT C/A/P that showed ÁNGEL.

## 2025-01-11 NOTE — REVIEW OF SYSTEMS
[Fatigue] : fatigue [Negative] : Allergic/Immunologic [FreeTextEntry3] : tearing eyes - improved. [de-identified] : Chronic cracking at his fingertips and palms bilateral.

## 2025-01-11 NOTE — HISTORY OF PRESENT ILLNESS
[Disease: _____________________] : Disease: [unfilled] [T: ___] : T[unfilled] [N: ___] : N[unfilled] [M: ___] : M[unfilled] [AJCC Stage: ____] : AJCC Stage: [unfilled] [de-identified] : CC: I have colon cancer  He is here at the request of Dr. Aguilar  This is a 56-year-old male with history of HTN, HLD, cardiac catheterization, cardiomyopathy for reduced ejection fraction on echo with no acute findings, no need for stent placement, who presents at the recommendation of his PCP to the ED on 8/19/2024 after an outpatient CT scan was done demonstrating an obstructing mass at the splenic flexure. Patient underwent CTAP due to GI discomfort for multiple months, not relieved with PRN GI medications. Patient states he has had intermittent constipation with decreased caliber of his stools. He also had diarrhea and abdominal distension, upper abdomen. He follows with Dr. Martin for his PCP, GI Dr. Gray, Cardiology Dr. Mckeon.  He had CT C/A/P on 8/19/24in [facility]: The patient's known constricting soft tissue mass is identified at level of the junction of the splenic flexure with the descending colon. There is marked dilatation of the cecum, ascending colon and transverse colon to the level of the obstructing lesion. The colon measures up to 11.5 cm in diameter. No CT evidence of metastasis.  He had a flex sig on 8/19/2024 with finding of descending colon mass s/p biopsy s/p tattoo distal to mass colonic stent placement.  Biopsy showed: Colon, splenic flexure, mass, biopsy: - Minute focus of high-grade carcinoma, in a background of marked ulceration / necrosis and fragments of colonic mucosa.  He had resection by Dr. Aguilar S/P robotic splenic flexure colectomy, Path: Final Diagnosis 1. Middle colic lymph node: -  1 lymph node, negative for tumor. 2.  Middle colic the lymphadenectomy: -  12 lymph nodes, negative for tumor. 3.  Fourth portion of duodenum serosa: -  Fibrofatty tissue with mild chronic inflammation. -  Negative for tumor. 4. Splenic flexure colectomy: -  Poorly differentiated, ulcerated adenocarcinoma with mucin production. -  Tumor invades muscle and pericolic tissue, pT3. -  20 lymph nodes are free of tumor. -  All resection margins are negative for tumor. -  Calretinin stain is negative in tumor with rules out medullary carcinoma.  Immunohistochemical studies were performed at [facility], [address] [place] [address], [id number] (see NOTE). Immunohistochemical stains have been performed using antibodies to the following mismatch repair proteins: hMLH1 (clone M1)   Positive hMSH2 (clone [id number])    Positive hMSH6 (clone [id number])     Positive PMS2 (clone A16-4)     Positive  Review of his blood work showed borderline mild anemia he had leukocytosis, CMP had some SVITLANA which resolved LFTs were normal his CA 19-9 and CEA were normal as well this is from August 20.  He is here for to discuss adjuvant options. [de-identified] : Adenocarcinoma  [de-identified] : October 15, 2024 Guero is here for follow-up he started capecitabine and is due for cycle 2 tomorrow he has no complications CBC came back demonstrating mild anemia with hemoglobin 11.5 may be from chemotherapy but I ordered iron studies as well today.  11/12/2024 he is on the week off - reports inadequate oral hydration and "tearing eyes".  11/26/2024 Reports feeling well at the baseline of his health status, no changes in chronic conditions or new complaints since the last visit.  12/17/2024 He is here for follow-up and reports no new complaints. He still has problems with cracking of his fingertips and palms, but this is his chronic condition that happens even before we started the treatment. He uses Eucerin cream and it seems helps a lot. But his condition worsens since the temperature is getting to wintertime.  1/9/25: Eduardo is here for the follow up. He is on Xeloda for stage II Colon cancer. he is tolerating treatment well. Today is his D2 of cycle. we reduced the dose to 1500 mg BD last time, and reports he feels much better now. His  reports "pain in his hands". 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 His weight is stable. Counselled patient on using urea cream more regularly. Denies any abdominal pain, blood in stools, headaches, blurred vision, dizziness. He had CT C/A/P that showed ÁNGEL.

## 2025-01-11 NOTE — PHYSICAL EXAM
[Fully active, able to carry on all pre-disease performance without restriction] : Status 0 - Fully active, able to carry on all pre-disease performance without restriction [Normal] : affect appropriate [de-identified] : well-healed recent surgical scars abdomen was soft no evidence of infection. [de-identified] : Chronic cracking at his fingertips and palms bilateral.

## 2025-01-11 NOTE — REVIEW OF SYSTEMS
[Fatigue] : fatigue [Negative] : Allergic/Immunologic [FreeTextEntry3] : tearing eyes - improved. [de-identified] : Chronic cracking at his fingertips and palms bilateral.

## 2025-01-25 NOTE — HISTORY OF PRESENT ILLNESS
[de-identified] : SALONI TITUS  is a pleasant 56 year old man  who came in 08/22/2024 for spelnic flexure obstructing lesion s/p stent couple days ago~  ~  . He has Dr EFRAIN SOTO as His PCP.  he reported multiple BM and less distension plan for colectomy next week no pain

## 2025-01-25 NOTE — ASSESSMENT
[FreeTextEntry1] : SALONI TITUS  is a pleasant 56 year old man  who came in 08/22/2024 for spelnic flexure obstructing lesion s/p stent couple days ago~  ~  . He has Dr EFRAIN SOTO as His PCP.  he reported multiple BM and less distension plan for colectomy next week no pain  robotic possible open partial colectomy   09/12/2024 : s/p robotic splenic flexure colectomy, path is [id number], sending for med onc, will see in two months  12/10/2024 : some nausea and vomiting and bloating, he is currently on chemo, exam is unchanged,  will send for ct a/p   01/14/2025 : telehealth via phone,no new reported symptoms, exam is unchanged, discussed his jan 2025 CT CAP-> ÁNGEL,cont xeloda and repeat scans in 3-6 months  the above plan of care with discussed in details to the patient and all questions were answered to patient satisfaction. patient instructed to follow up with the referring physician and patient primary care provider   A total of 21minutes was spent on this visit, obtaining h/p,  reviewing previous notes/imaging, counseling the patient on adjuvant therapy and f/u , ordering tests (below), and documenting the findings in the note.

## 2025-01-27 NOTE — PHYSICAL EXAM
[Fully active, able to carry on all pre-disease performance without restriction] : Status 0 - Fully active, able to carry on all pre-disease performance without restriction [Normal] : affect appropriate [de-identified] : well-healed recent surgical scars abdomen was soft no evidence of infection. [de-identified] : Chronic cracking at his fingertips and palms bilateral.

## 2025-01-27 NOTE — REVIEW OF SYSTEMS
[Fatigue] : fatigue [Negative] : Allergic/Immunologic [FreeTextEntry3] : tearing eyes - improved. [de-identified] : Chronic cracking at his fingertips and palms bilateral.

## 2025-01-27 NOTE — ASSESSMENT
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 - 20 lymph nodes removed the tumor was poorly differentiated as well as there was evidence of obstruction. - Other high-risk features such as LVI or PNI were not reported. - I had an extensive discussion about the role of adjuvant chemotherapy and stage II colon cancer I explained that at most adjuvant chemotherapy improve survival by about 2 to 4%. He does have high risk clinical features granted these alone does not guarantee that there will be benefit from adjuvant chemotherapy. - We went over the following options which include observation, adjuvant chemotherapy with capecitabine for 6 months, I explained the uncertain role of oxaliplatin and stage II colon cancer and that there is no definitive data that did improve survival, another option is discussed was a clinical trial NRG-. Although this trial is currently not available in [place] it is available and multiple [facility] centers including [facility]. I also discussed the role of minimal residual disease testing with circulating tumor DNA analysis to guide therapy. N Engl J Med 2022;386:8825-0695 DOI: 10.1056/[id number] and the mentation of this technology was also discussed with patient. - I went over the side effects of capecitabine in detail including but not limited to nausea, vomiting, rashes, diarrhea, cytopenia and severe toxicity such as mucositis and pancytopenia if his DPD deficiency. - After an extensive discussion he favored to start capecitabine for 6 months and also wanted to obtain Signatera testing if the result is positive, we would escalate treatment and add oxaliplatin and complete 6 months of total treatment. - Signatera was negative. - 09/18/2024 started single agent Capecitabine 1000 mg/m2 = 2000 mg BID D1-14 Q21D for total of 6 months. - 12/17/2024 Capecitabine dose decreased to 1500 mg PO D1-14 Q21D due exacerbation of hand-foot syndrome. - CT C/A/P 12/31/24: ÁNGEL  PLAN: - c/w dose reduced Capecitabine 1500 mg PO BID D1-14 Q21D for total of 6 months - Cycle 7 of 8 to start tomorrow - continue Zofran, Imodium and urea cream PRN. - repeat CT scan of the chest abdomen pelvis in approximately 1 year 12/2025 and repeat colonoscopy around 09/2025 - F/U with ophthalmology for tearing eyes to r/o other causes of tearing eyes. - Labs today: CBC CMP  - repeat CEA every 3 months - 02/2025.  RTC in 3 weeks with CBC CMP ferritin iron studies

## 2025-01-27 NOTE — HISTORY OF PRESENT ILLNESS
[Disease: _____________________] : Disease: [unfilled] [T: ___] : T[unfilled] [N: ___] : N[unfilled] [M: ___] : M[unfilled] [AJCC Stage: ____] : AJCC Stage: [unfilled] [de-identified] : CC: I have colon cancer  He is here at the request of Dr. Aguilar  This is a 56-year-old male with history of HTN, HLD, cardiac catheterization, cardiomyopathy for reduced ejection fraction on echo with no acute findings, no need for stent placement, who presents at the recommendation of his PCP to the ED on 8/19/2024 after an outpatient CT scan was done demonstrating an obstructing mass at the splenic flexure. Patient underwent CTAP due to GI discomfort for multiple months, not relieved with PRN GI medications. Patient states he has had intermittent constipation with decreased caliber of his stools. He also had diarrhea and abdominal distension, upper abdomen. He follows with Dr. Martin for his PCP, GI Dr. Gray, Cardiology Dr. Mckeon.  He had CT C/A/P on 8/19/24in [facility]: The patient's known constricting soft tissue mass is identified at level of the junction of the splenic flexure with the descending colon. There is marked dilatation of the cecum, ascending colon and transverse colon to the level of the obstructing lesion. The colon measures up to 11.5 cm in diameter. No CT evidence of metastasis.  He had a flex sig on 8/19/2024 with finding of descending colon mass s/p biopsy s/p tattoo distal to mass colonic stent placement.  Biopsy showed: Colon, splenic flexure, mass, biopsy: - Minute focus of high-grade carcinoma, in a background of marked ulceration / necrosis and fragments of colonic mucosa.  He had resection by Dr. Agiular S/P robotic splenic flexure colectomy, Path: Final Diagnosis 1. Middle colic lymph node: -  1 lymph node, negative for tumor. 2.  Middle colic the lymphadenectomy: -  12 lymph nodes, negative for tumor. 3.  Fourth portion of duodenum serosa: -  Fibrofatty tissue with mild chronic inflammation. -  Negative for tumor. 4. Splenic flexure colectomy: -  Poorly differentiated, ulcerated adenocarcinoma with mucin production. -  Tumor invades muscle and pericolic tissue, pT3. -  20 lymph nodes are free of tumor. -  All resection margins are negative for tumor. -  Calretinin stain is negative in tumor with rules out medullary carcinoma.  Immunohistochemical studies were performed at [facility], [address] [place] [address], [id number] (see NOTE). Immunohistochemical stains have been performed using antibodies to the following mismatch repair proteins: hMLH1 (clone M1)   Positive hMSH2 (clone [id number])    Positive hMSH6 (clone [id number])     Positive PMS2 (clone [id number])     Positive  Review of his blood work showed borderline mild anemia he had leukocytosis, CMP had some SVITLANA which resolved LFTs were normal his CA 19-9 and CEA were normal as well this is from August 20.  He is here for to discuss adjuvant options. [de-identified] : Adenocarcinoma  [de-identified] : October 15, 2024 Guero is here for follow-up he started capecitabine and is due for cycle 2 tomorrow he has no complications CBC came back demonstrating mild anemia with hemoglobin 11.5 may be from chemotherapy but I ordered iron studies as well today.  11/12/2024 he is on the week off - reports inadequate oral hydration and "tearing eyes".  11/26/2024 Reports feeling well at the baseline of his health status, no changes in chronic conditions or new complaints since the last visit.  12/17/2024 He is here for follow-up and reports no new complaints. He still has problems with cracking of his fingertips and palms, but this is his chronic condition that happens even before we started the treatment. He uses Eucerin cream and it seems helps a lot. But his condition worsens since the temperature is getting to wintertime.  1/9/25: Pt is here for the follow up. He is on Xeloda for stage II Colon cancer. he is tolerating treatment well. Today is his D2 of cycle. we reduced the dose to 1500 mg BD last time, and reports he feels much better now. His  reports "pain in his hands". 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 His weight is stable. Counselled patient on using urea cream more regularly. Denies any abdominal pain, blood in stools, headaches, blurred vision, dizziness. He had CT C/A/P that showed ÁNGEL.   January 27, 2025 He is here for follow-up he is doing well.  Hands and feet are a bit better with lower dose.  White blood cell count today is 10.5 hemoglobin 12.2 platelets 229 neutrophils 6800 approximately

## 2025-02-18 NOTE — ASSESSMENT
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 - 20 lymph nodes removed the tumor was poorly differentiated as well as there was evidence of obstruction. - Other high-risk features such as LVI or PNI were not reported. - I had an extensive discussion about the role of adjuvant chemotherapy and stage II colon cancer I explained that at most adjuvant chemotherapy improve survival by about 2 to 4%. He does have high risk clinical features granted these alone does not guarantee that there will be benefit from adjuvant chemotherapy. - We went over the following options which include observation, adjuvant chemotherapy with capecitabine for 6 months, I explained the uncertain role of oxaliplatin and stage II colon cancer and that there is no definitive data that did improve survival, another option is discussed was a clinical trial NRG-. Although this trial is currently not available in [place] it is available and multiple [facility] centers including [facility]. I also discussed the role of minimal residual disease testing with circulating tumor DNA analysis to guide therapy. N Engl J Med 2022;386:1350-4542 DOI: 10.1056/[id number] and the mentation of this technology was also discussed with patient. - I went over the side effects of capecitabine in detail including but not limited to nausea, vomiting, rashes, diarrhea, cytopenia and severe toxicity such as mucositis and pancytopenia if his DPD deficiency. - After an extensive discussion he favored to start capecitabine for 6 months and also wanted to obtain Signatera testing if the result is positive, we would escalate treatment and add oxaliplatin and complete 6 months of total treatment. - Signatera was negative. - 09/18/2024 started single agent Capecitabine 1000 mg/m2 = 2000 mg BID D1-14 Q21D for total of 6 months. - 12/17/2024 Capecitabine dose decreased to 1500 mg PO D1-14 Q21D due exacerbation of hand-foot syndrome. - 12/31/2024 CT C/A/P - ÁNGEL.  02/18/2025 Labs reviewed and results discussed with the patient - remains clinically stable to continue current management. All questions were answered to satisfaction.  PLAN: - continue dose reduced Capecitabine 1500 mg PO BID D1-14 Q21D for total of 6 months - Cycle 8 of 8 to start tomorrow. - continue Zofran, Imodium and urea cream PRN. - repeat colonoscopy around 09/2025. - repeat CT scan of the chest abdomen pelvis in approximately 1-year 12/2025. - F/U with ophthalmology for tearing eyes to r/o other causes of tearing eyes. - Labs today: CBC CMP CEA - repeat CEA every 3 months - 02/2025 - ordered. RTC in 4 weeks with CBC CMP CEA

## 2025-02-18 NOTE — HISTORY OF PRESENT ILLNESS
[Disease: _____________________] : Disease: [unfilled] [T: ___] : T[unfilled] [N: ___] : N[unfilled] [M: ___] : M[unfilled] [AJCC Stage: ____] : AJCC Stage: [unfilled] [de-identified] : CC: I have colon cancer  He is here at the request of Dr. Aguilar  This is a 56-year-old male with history of HTN, HLD, cardiac catheterization, cardiomyopathy for reduced ejection fraction on echo with no acute findings, no need for stent placement, who presents at the recommendation of his PCP to the ED on 8/19/2024 after an outpatient CT scan was done demonstrating an obstructing mass at the splenic flexure. Patient underwent CTAP due to GI discomfort for multiple months, not relieved with PRN GI medications. Patient states he has had intermittent constipation with decreased caliber of his stools. He also had diarrhea and abdominal distension, upper abdomen. He follows with Dr. Martin for his PCP, GI Dr. Gray, Cardiology Dr. Mckeon.  He had CT C/A/P on 8/19/24in [facility]: The patient's known constricting soft tissue mass is identified at level of the junction of the splenic flexure with the descending colon. There is marked dilatation of the cecum, ascending colon and transverse colon to the level of the obstructing lesion. The colon measures up to 11.5 cm in diameter. No CT evidence of metastasis.  He had a flex sig on 8/19/2024 with finding of descending colon mass s/p biopsy s/p tattoo distal to mass colonic stent placement.  Biopsy showed: Colon, splenic flexure, mass, biopsy: - Minute focus of high-grade carcinoma, in a background of marked ulceration / necrosis and fragments of colonic mucosa.  He had resection by Dr. Aguilar S/P robotic splenic flexure colectomy, Path: Final Diagnosis 1. Middle colic lymph node: -  1 lymph node, negative for tumor. 2.  Middle colic the lymphadenectomy: -  12 lymph nodes, negative for tumor. 3.  Fourth portion of duodenum serosa: -  Fibrofatty tissue with mild chronic inflammation. -  Negative for tumor. 4. Splenic flexure colectomy: -  Poorly differentiated, ulcerated adenocarcinoma with mucin production. -  Tumor invades muscle and pericolic tissue, pT3. -  20 lymph nodes are free of tumor. -  All resection margins are negative for tumor. -  Calretinin stain is negative in tumor with rules out medullary carcinoma.  Immunohistochemical studies were performed at [facility], [address] [place] [address], [id number] (see NOTE). Immunohistochemical stains have been performed using antibodies to the following mismatch repair proteins: hMLH1 (clone M1)   Positive hMSH2 (clone [id number])    Positive hMSH6 (clone SP93)     Positive PMS2 (clone [id number])     Positive  Review of his blood work showed borderline mild anemia he had leukocytosis, CMP had some SVITLANA which resolved LFTs were normal his CA 19-9 and CEA were normal as well this is from August 20.  He is here for to discuss adjuvant options. [de-identified] : Adenocarcinoma  [de-identified] : October 15, 2024 Guero is here for follow-up he started capecitabine and is due for cycle 2 tomorrow he has no complications CBC came back demonstrating mild anemia with hemoglobin 11.5 may be from chemotherapy but I ordered iron studies as well today.  11/12/2024 he is on the week off - reports inadequate oral hydration and "tearing eyes".  11/26/2024 Reports feeling well at the baseline of his health status, no changes in chronic conditions or new complaints since the last visit.  12/17/2024 He is here for follow-up and reports no new complaints. He still has problems with cracking of his fingertips and palms, but this is his chronic condition that happens even before we started the treatment. He uses Eucerin cream and it seems helps a lot. But his condition worsens since the temperature is getting to wintertime.  1/9/25: Pt is here for the follow up. He is on Xeloda for stage II Colon cancer. he is tolerating treatment well. Today is his D2 of cycle. we reduced the dose to 1500 mg BD last time, and reports he feels much better now. His  reports "pain in his hands". 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 His weight is stable. Counselled patient on using urea cream more regularly. Denies any abdominal pain, blood in stools, headaches, blurred vision, dizziness. He had CT C/A/P that showed ÁNGEL.   January 27, 2025 He is here for follow-up he is doing well. Hands and feet are a bit better with lower dose. White blood cell count today is 10.5 hemoglobin 12.2 platelets 229 neutrophils 6800 approximately.  02/18/2025 He is here today for follow-up and is very excited to start his last cycle of capecitabine. He is feeling and doing well. He continues to work.

## 2025-02-18 NOTE — REVIEW OF SYSTEMS
[Fatigue] : fatigue [Negative] : Allergic/Immunologic [FreeTextEntry3] : tearing eyes - improved. [de-identified] : Chronic cracking at his fingertips and palms bilateral.

## 2025-03-20 NOTE — PHYSICAL EXAM
[Fully active, able to carry on all pre-disease performance without restriction] : Status 0 - Fully active, able to carry on all pre-disease performance without restriction [Normal] : affect appropriate [de-identified] : well-healed recent surgical scars abdomen was soft no evidence of infection. [de-identified] : Chronic cracking at his fingertips and palms bilateral - Still present

## 2025-03-20 NOTE — HISTORY OF PRESENT ILLNESS
[Disease: _____________________] : Disease: [unfilled] [T: ___] : T[unfilled] [N: ___] : N[unfilled] [M: ___] : M[unfilled] [AJCC Stage: ____] : AJCC Stage: [unfilled] [de-identified] : CC: I have colon cancer  He is here at the request of Dr. Aguilar  This is a 56-year-old male with history of HTN, HLD, cardiac catheterization, cardiomyopathy for reduced ejection fraction on echo with no acute findings, no need for stent placement, who presents at the recommendation of his PCP to the ED on 8/19/2024 after an outpatient CT scan was done demonstrating an obstructing mass at the splenic flexure. Patient underwent CTAP due to GI discomfort for multiple months, not relieved with PRN GI medications. Patient states he has had intermittent constipation with decreased caliber of his stools. He also had diarrhea and abdominal distension, upper abdomen. He follows with Dr. Martin for his PCP, GI Dr. Gray, Cardiology Dr. Mckeon.  He had CT C/A/P on 8/19/24in [facility]: The patient's known constricting soft tissue mass is identified at level of the junction of the splenic flexure with the descending colon. There is marked dilatation of the cecum, ascending colon and transverse colon to the level of the obstructing lesion. The colon measures up to 11.5 cm in diameter. No CT evidence of metastasis.  He had a flex sig on 8/19/2024 with finding of descending colon mass s/p biopsy s/p tattoo distal to mass colonic stent placement.  Biopsy showed: Colon, splenic flexure, mass, biopsy: - Minute focus of high-grade carcinoma, in a background of marked ulceration / necrosis and fragments of colonic mucosa.  He had resection by Dr. Aguilar S/P robotic splenic flexure colectomy, Path: Final Diagnosis 1. Middle colic lymph node: -  1 lymph node, negative for tumor. 2.  Middle colic the lymphadenectomy: -  12 lymph nodes, negative for tumor. 3.  Fourth portion of duodenum serosa: -  Fibrofatty tissue with mild chronic inflammation. -  Negative for tumor. 4. Splenic flexure colectomy: -  Poorly differentiated, ulcerated adenocarcinoma with mucin production. -  Tumor invades muscle and pericolic tissue, pT3. -  20 lymph nodes are free of tumor. -  All resection margins are negative for tumor. -  Calretinin stain is negative in tumor with rules out medullary carcinoma.  Immunohistochemical studies were performed at [facility], [address] [place] [address], [id number] (see NOTE). Immunohistochemical stains have been performed using antibodies to the following mismatch repair proteins: hMLH1 (clone M1)   Positive hMSH2 (clone [id number])    Positive hMSH6 (clone SP93)     Positive PMS2 (clone A16-4)     Positive  Review of his blood work showed borderline mild anemia he had leukocytosis, CMP had some SVITLANA which resolved LFTs were normal his CA 19-9 and CEA were normal as well this is from August 20.  He is here for to discuss adjuvant options. [de-identified] : Adenocarcinoma  [de-identified] : October 15, 2024 Guero is here for follow-up he started capecitabine and is due for cycle 2 tomorrow he has no complications CBC came back demonstrating mild anemia with hemoglobin 11.5 may be from chemotherapy but I ordered iron studies as well today.  11/12/2024 he is on the week off - reports inadequate oral hydration and "tearing eyes".  11/26/2024 Reports feeling well at the baseline of his health status, no changes in chronic conditions or new complaints since the last visit.  12/17/2024 He is here for follow-up and reports no new complaints. He still has problems with cracking of his fingertips and palms, but this is his chronic condition that happens even before we started the treatment. He uses Eucerin cream and it seems helps a lot. But his condition worsens since the temperature is getting to wintertime.  1/9/25: Pt is here for the follow up. He is on Xeloda for stage II Colon cancer. he is tolerating treatment well. Today is his D2 of cycle. we reduced the dose to 1500 mg BD last time, and reports he feels much better now. His  reports "pain in his hands". 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 His weight is stable. Counselled patient on using urea cream more regularly. Denies any abdominal pain, blood in stools, headaches, blurred vision, dizziness. He had CT C/A/P that showed ÁNGEL.   January 27, 2025 He is here for follow-up he is doing well. Hands and feet are a bit better with lower dose. White blood cell count today is 10.5 hemoglobin 12.2 platelets 229 neutrophils 6800 approximately.  02/18/2025 He is here today for follow-up and is very excited to start his last cycle of capecitabine. He is feeling and doing well. He continues to work.  03/19/2025 He is here for follow-up and feeling and doing well.  He completed 6 months of capecitabine and his hands are doing tiny better.

## 2025-03-20 NOTE — HISTORY OF PRESENT ILLNESS
[Disease: _____________________] : Disease: [unfilled] [T: ___] : T[unfilled] [N: ___] : N[unfilled] [M: ___] : M[unfilled] [AJCC Stage: ____] : AJCC Stage: [unfilled] [de-identified] : CC: I have colon cancer  He is here at the request of Dr. Aguilar  This is a 56-year-old male with history of HTN, HLD, cardiac catheterization, cardiomyopathy for reduced ejection fraction on echo with no acute findings, no need for stent placement, who presents at the recommendation of his PCP to the ED on 8/19/2024 after an outpatient CT scan was done demonstrating an obstructing mass at the splenic flexure. Patient underwent CTAP due to GI discomfort for multiple months, not relieved with PRN GI medications. Patient states he has had intermittent constipation with decreased caliber of his stools. He also had diarrhea and abdominal distension, upper abdomen. He follows with Dr. Martin for his PCP, GI Dr. Gray, Cardiology Dr. Mckeon.  He had CT C/A/P on 8/19/24in [facility]: The patient's known constricting soft tissue mass is identified at level of the junction of the splenic flexure with the descending colon. There is marked dilatation of the cecum, ascending colon and transverse colon to the level of the obstructing lesion. The colon measures up to 11.5 cm in diameter. No CT evidence of metastasis.  He had a flex sig on 8/19/2024 with finding of descending colon mass s/p biopsy s/p tattoo distal to mass colonic stent placement.  Biopsy showed: Colon, splenic flexure, mass, biopsy: - Minute focus of high-grade carcinoma, in a background of marked ulceration / necrosis and fragments of colonic mucosa.  He had resection by Dr. Aguilar S/P robotic splenic flexure colectomy, Path: Final Diagnosis 1. Middle colic lymph node: -  1 lymph node, negative for tumor. 2.  Middle colic the lymphadenectomy: -  12 lymph nodes, negative for tumor. 3.  Fourth portion of duodenum serosa: -  Fibrofatty tissue with mild chronic inflammation. -  Negative for tumor. 4. Splenic flexure colectomy: -  Poorly differentiated, ulcerated adenocarcinoma with mucin production. -  Tumor invades muscle and pericolic tissue, pT3. -  20 lymph nodes are free of tumor. -  All resection margins are negative for tumor. -  Calretinin stain is negative in tumor with rules out medullary carcinoma.  Immunohistochemical studies were performed at [facility], [address] [place] [address], [id number] (see NOTE). Immunohistochemical stains have been performed using antibodies to the following mismatch repair proteins: hMLH1 (clone M1)   Positive hMSH2 (clone [id number])    Positive hMSH6 (clone SP93)     Positive PMS2 (clone A16-4)     Positive  Review of his blood work showed borderline mild anemia he had leukocytosis, CMP had some SVITLANA which resolved LFTs were normal his CA 19-9 and CEA were normal as well this is from August 20.  He is here for to discuss adjuvant options. [de-identified] : Adenocarcinoma  [de-identified] : October 15, 2024 Guero is here for follow-up he started capecitabine and is due for cycle 2 tomorrow he has no complications CBC came back demonstrating mild anemia with hemoglobin 11.5 may be from chemotherapy but I ordered iron studies as well today.  11/12/2024 he is on the week off - reports inadequate oral hydration and "tearing eyes".  11/26/2024 Reports feeling well at the baseline of his health status, no changes in chronic conditions or new complaints since the last visit.  12/17/2024 He is here for follow-up and reports no new complaints. He still has problems with cracking of his fingertips and palms, but this is his chronic condition that happens even before we started the treatment. He uses Eucerin cream and it seems helps a lot. But his condition worsens since the temperature is getting to wintertime.  1/9/25: Pt is here for the follow up. He is on Xeloda for stage II Colon cancer. he is tolerating treatment well. Today is his D2 of cycle. we reduced the dose to 1500 mg BD last time, and reports he feels much better now. His  reports "pain in his hands". 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 His weight is stable. Counselled patient on using urea cream more regularly. Denies any abdominal pain, blood in stools, headaches, blurred vision, dizziness. He had CT C/A/P that showed ÁNGEL.   January 27, 2025 He is here for follow-up he is doing well. Hands and feet are a bit better with lower dose. White blood cell count today is 10.5 hemoglobin 12.2 platelets 229 neutrophils 6800 approximately.  02/18/2025 He is here today for follow-up and is very excited to start his last cycle of capecitabine. He is feeling and doing well. He continues to work.  03/19/2025 He is here for follow-up and feeling and doing well.  He completed 6 months of capecitabine and his hands are doing tiny better.

## 2025-03-20 NOTE — PHYSICAL EXAM
[Fully active, able to carry on all pre-disease performance without restriction] : Status 0 - Fully active, able to carry on all pre-disease performance without restriction [Normal] : affect appropriate [de-identified] : well-healed recent surgical scars abdomen was soft no evidence of infection. [de-identified] : Chronic cracking at his fingertips and palms bilateral - Still present

## 2025-03-20 NOTE — ASSESSMENT
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 - 20 lymph nodes removed the tumor was poorly differentiated as well as there was evidence of obstruction. - Other high-risk features such as LVI or PNI were not reported. - I had an extensive discussion about the role of adjuvant chemotherapy and stage II colon cancer I explained that at most adjuvant chemotherapy improve survival by about 2 to 4%. He does have high risk clinical features granted these alone does not guarantee that there will be benefit from adjuvant chemotherapy. - We went over the following options which include observation, adjuvant chemotherapy with capecitabine for 6 months, I explained the uncertain role of oxaliplatin and stage II colon cancer and that there is no definitive data that did improve survival, another option is discussed was a clinical trial NRG-. Although this trial is currently not available in [place] it is available and multiple [facility] centers including [facility]. I also discussed the role of minimal residual disease testing with circulating tumor DNA analysis to guide therapy. N Engl J Med 2022;386:9091-6015 DOI: 10.1056/YTRKzg3300192 and the mentation of this technology was also discussed with patient. - I went over the side effects of capecitabine in detail including but not limited to nausea, vomiting, rashes, diarrhea, cytopenia and severe toxicity such as mucositis and pancytopenia if his DPD deficiency. - After an extensive discussion he favored to start capecitabine for 6 months and also wanted to obtain Signatera testing if the result is positive, we would escalate treatment and add oxaliplatin and complete 6 months of total treatment. - Signatera was negative. - 09/18/2024 - 03/03/2025 completed single agent Capecitabine 1000 mg/m2 = 2000 mg BID D1-14 Q21D for total of 6 months. - 12/17/2024 Capecitabine dose decreased to 1500 mg PO D1-14 Q21D due exacerbation of hand-foot syndrome. - 12/31/2024 CT C/A/P - ÁNGEL.  03/19/2025 Labs reviewed and results discussed with the patient - remains clinically stable to continue current management. All questions were answered to satisfaction.  PLAN: - continue with surveillance. - repeat colonoscopy around 09/2025. - repeat CT scan of the chest abdomen pelvis in approximately 1-year 12/2025. - F/U with ophthalmology for tearing eyes to r/o other causes of tearing eyes. - Labs today: CBC CMP CEA - repeat CEA every 3 months - 02/2025 - ordered. RTC in June CBC CMP CEA

## 2025-03-20 NOTE — REVIEW OF SYSTEMS
[Fatigue] : fatigue [Negative] : Allergic/Immunologic [FreeTextEntry3] : tearing eyes - improved. [de-identified] : Chronic cracking at his fingertips and palms bilateral.

## 2025-03-20 NOTE — END OF VISIT
[FreeTextEntry3] : I was physically present for the key portions of the evaluation and management service provided.  I agree with the history and physical, and plan which I have reviewed and edited where appropriate.  Guero is here for follow-up he completed his capecitabine for adjuvant therapy this month.  Will continue on surveillance next CAT scan will be due in December colonoscopy in September blood work was done today he could see us back in June

## 2025-03-20 NOTE — ASSESSMENT
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 - 20 lymph nodes removed the tumor was poorly differentiated as well as there was evidence of obstruction. - Other high-risk features such as LVI or PNI were not reported. - I had an extensive discussion about the role of adjuvant chemotherapy and stage II colon cancer I explained that at most adjuvant chemotherapy improve survival by about 2 to 4%. He does have high risk clinical features granted these alone does not guarantee that there will be benefit from adjuvant chemotherapy. - We went over the following options which include observation, adjuvant chemotherapy with capecitabine for 6 months, I explained the uncertain role of oxaliplatin and stage II colon cancer and that there is no definitive data that did improve survival, another option is discussed was a clinical trial NRG-. Although this trial is currently not available in [place] it is available and multiple [facility] centers including [facility]. I also discussed the role of minimal residual disease testing with circulating tumor DNA analysis to guide therapy. N Engl J Med 2022;386:3879-9749 DOI: 10.1056/[id number] and the mentation of this technology was also discussed with patient. - I went over the side effects of capecitabine in detail including but not limited to nausea, vomiting, rashes, diarrhea, cytopenia and severe toxicity such as mucositis and pancytopenia if his DPD deficiency. - After an extensive discussion he favored to start capecitabine for 6 months and also wanted to obtain Signatera testing if the result is positive, we would escalate treatment and add oxaliplatin and complete 6 months of total treatment. - Signatera was negative. - 09/18/2024 - 03/03/2025 completed single agent Capecitabine 1000 mg/m2 = 2000 mg BID D1-14 Q21D for total of 6 months. - 12/17/2024 Capecitabine dose decreased to 1500 mg PO D1-14 Q21D due exacerbation of hand-foot syndrome. - 12/31/2024 CT C/A/P - ÁNGEL.  03/19/2025 Labs reviewed and results discussed with the patient - remains clinically stable to continue current management. All questions were answered to satisfaction.  PLAN: - continue with surveillance. - repeat colonoscopy around 09/2025. - repeat CT scan of the chest abdomen pelvis in approximately 1-year 12/2025. - F/U with ophthalmology for tearing eyes to r/o other causes of tearing eyes. - Labs today: CBC CMP CEA - repeat CEA every 3 months - 02/2025 - ordered. RTC in June CBC CMP CEA

## 2025-03-20 NOTE — REVIEW OF SYSTEMS
[Fatigue] : fatigue [Negative] : Allergic/Immunologic [FreeTextEntry3] : tearing eyes - improved. [de-identified] : Chronic cracking at his fingertips and palms bilateral.

## 2025-06-24 NOTE — PHYSICAL EXAM
[Fully active, able to carry on all pre-disease performance without restriction] : Status 0 - Fully active, able to carry on all pre-disease performance without restriction [Normal] : normoactive bowel sounds, soft and nontender, no hepatosplenomegaly or masses appreciated [de-identified] : well-healed surgical scars [de-identified] : Chronic cracking at his fingertips and palms bilateral - Unchanged.

## 2025-06-24 NOTE — HISTORY OF PRESENT ILLNESS
[Disease: _____________________] : Disease: [unfilled] [T: ___] : T[unfilled] [N: ___] : N[unfilled] [M: ___] : M[unfilled] [AJCC Stage: ____] : AJCC Stage: [unfilled] [de-identified] : CC: I have colon cancer  He is here at the request of Dr. Aguilar  This is a 56-year-old male with history of HTN, HLD, cardiac catheterization, cardiomyopathy for reduced ejection fraction on echo with no acute findings, no need for stent placement, who presents at the recommendation of his PCP to the ED on 8/19/2024 after an outpatient CT scan was done demonstrating an obstructing mass at the splenic flexure. Patient underwent CTAP due to GI discomfort for multiple months, not relieved with PRN GI medications. Patient states he has had intermittent constipation with decreased caliber of his stools. He also had diarrhea and abdominal distension, upper abdomen. He follows with Dr. Martin for his PCP, GI Dr. Gray, Cardiology Dr. Mckeon.  He had CT C/A/P on 8/19/24in [facility]: The patient's known constricting soft tissue mass is identified at level of the junction of the splenic flexure with the descending colon. There is marked dilatation of the cecum, ascending colon and transverse colon to the level of the obstructing lesion. The colon measures up to 11.5 cm in diameter. No CT evidence of metastasis.  He had a flex sig on 8/19/2024 with finding of descending colon mass s/p biopsy s/p tattoo distal to mass colonic stent placement.  Biopsy showed: Colon, splenic flexure, mass, biopsy: - Minute focus of high-grade carcinoma, in a background of marked ulceration / necrosis and fragments of colonic mucosa.  He had resection by Dr. Aguilar S/P robotic splenic flexure colectomy, Path: Final Diagnosis 1. Middle colic lymph node: -  1 lymph node, negative for tumor. 2.  Middle colic the lymphadenectomy: -  12 lymph nodes, negative for tumor. 3.  Fourth portion of duodenum serosa: -  Fibrofatty tissue with mild chronic inflammation. -  Negative for tumor. 4. Splenic flexure colectomy: -  Poorly differentiated, ulcerated adenocarcinoma with mucin production. -  Tumor invades muscle and pericolic tissue, pT3. -  20 lymph nodes are free of tumor. -  All resection margins are negative for tumor. -  Calretinin stain is negative in tumor with rules out medullary carcinoma.  Immunohistochemical studies were performed at [facility], [address] [place] [address], [id number] (see NOTE). Immunohistochemical stains have been performed using antibodies to the following mismatch repair proteins: hMLH1 (clone M1)   Positive hMSH2 (clone [id number])    Positive hMSH6 (clone [id number])     Positive PMS2 (clone [id number])     Positive  Review of his blood work showed borderline mild anemia he had leukocytosis, CMP had some SVITLANA which resolved LFTs were normal his CA 19-9 and CEA were normal as well this is from August 20.  He is here for to discuss adjuvant options. [de-identified] : Adenocarcinoma  [de-identified] : October 15, 2024 Guero is here for follow-up he started capecitabine and is due for cycle 2 tomorrow he has no complications CBC came back demonstrating mild anemia with hemoglobin 11.5 may be from chemotherapy but I ordered iron studies as well today.  11/12/2024 he is on the week off - reports inadequate oral hydration and "tearing eyes".  11/26/2024 Reports feeling well at the baseline of his health status, no changes in chronic conditions or new complaints since the last visit.  12/17/2024 He is here for follow-up and reports no new complaints. He still has problems with cracking of his fingertips and palms, but this is his chronic condition that happens even before we started the treatment. He uses Eucerin cream and it seems helps a lot. But his condition worsens since the temperature is getting to wintertime.  1/9/25: Pt is here for the follow up. He is on Xeloda for stage II Colon cancer. he is tolerating treatment well. Today is his D2 of cycle. we reduced the dose to 1500 mg BD last time, and reports he feels much better now. His  reports "pain in his hands". 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 His weight is stable. Counselled patient on using urea cream more regularly. Denies any abdominal pain, blood in stools, headaches, blurred vision, dizziness. He had CT C/A/P that showed ÁNGEL.   January 27, 2025 He is here for follow-up he is doing well. Hands and feet are a bit better with lower dose. White blood cell count today is 10.5 hemoglobin 12.2 platelets 229 neutrophils 6800 approximately.  02/18/2025 He is here today for follow-up and is very excited to start his last cycle of capecitabine. He is feeling and doing well. He continues to work.  03/19/2025 He is here for follow-up and feeling and doing well. He completed 6 months of capecitabine and his hands are doing tiny better.  06/18/2025 Reports feeling well at the baseline of his health status, no changes in chronic conditions or new complaints since the last visit. Reports good psychosocial support at home.

## 2025-06-24 NOTE — ASSESSMENT
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 - 20 lymph nodes removed the tumor was poorly differentiated as well as there was evidence of obstruction. - Other high-risk features such as LVI or PNI were not reported. - I had an extensive discussion about the role of adjuvant chemotherapy and stage II colon cancer I explained that at most adjuvant chemotherapy improve survival by about 2 to 4%. He does have high risk clinical features granted these alone does not guarantee that there will be benefit from adjuvant chemotherapy. - We went over the following options which include observation, adjuvant chemotherapy with capecitabine for 6 months, I explained the uncertain role of oxaliplatin and stage II colon cancer and that there is no definitive data that did improve survival, another option is discussed was a clinical trial NRG-. Although this trial is currently not available in [place] it is available and multiple [facility] centers including [facility]. I also discussed the role of minimal residual disease testing with circulating tumor DNA analysis to guide therapy. N Engl J Med 2022;386:2809-7219 DOI: 10.1056/[id number] and the mentation of this technology was also discussed with patient. - I went over the side effects of capecitabine in detail including but not limited to nausea, vomiting, rashes, diarrhea, cytopenia and severe toxicity such as mucositis and pancytopenia if his DPD deficiency. - After an extensive discussion he favored to start capecitabine for 6 months and also wanted to obtain Signatera testing if the result is positive, we would escalate treatment and add oxaliplatin and complete 6 months of total treatment. - Signatera was negative. - 09/18/2024 - 03/03/2025 completed single agent Capecitabine 1000 mg/m2 = 2000 mg BID D1-14 Q21D for total of 6 months. - 12/17/2024 Capecitabine dose decreased to 1500 mg PO D1-14 Q21D due exacerbation of hand-foot syndrome. - 12/31/2024 CT C/A/P - ÁNGEL.  06/18/2025 Labs reviewed and results discussed with the patient; no pain reported - remains clinically stable to continue current management. All questions were answered to satisfaction.  PLAN: - continue with surveillance. - repeat colonoscopy around 09/2025. - repeat CT C/A/P in approximately 1-year - 12/2025. - Labs today: CBC CMP CEA - repeat CEA every 3 months - next 09/2025. RTC in 3 months CBC CMP CEA

## 2025-06-24 NOTE — HISTORY OF PRESENT ILLNESS
[Disease: _____________________] : Disease: [unfilled] [T: ___] : T[unfilled] [N: ___] : N[unfilled] [M: ___] : M[unfilled] [AJCC Stage: ____] : AJCC Stage: [unfilled] [de-identified] : CC: I have colon cancer  He is here at the request of Dr. Aguilar  This is a 56-year-old male with history of HTN, HLD, cardiac catheterization, cardiomyopathy for reduced ejection fraction on echo with no acute findings, no need for stent placement, who presents at the recommendation of his PCP to the ED on 8/19/2024 after an outpatient CT scan was done demonstrating an obstructing mass at the splenic flexure. Patient underwent CTAP due to GI discomfort for multiple months, not relieved with PRN GI medications. Patient states he has had intermittent constipation with decreased caliber of his stools. He also had diarrhea and abdominal distension, upper abdomen. He follows with Dr. Martin for his PCP, GI Dr. Gray, Cardiology Dr. Mckeon.  He had CT C/A/P on 8/19/24in [facility]: The patient's known constricting soft tissue mass is identified at level of the junction of the splenic flexure with the descending colon. There is marked dilatation of the cecum, ascending colon and transverse colon to the level of the obstructing lesion. The colon measures up to 11.5 cm in diameter. No CT evidence of metastasis.  He had a flex sig on 8/19/2024 with finding of descending colon mass s/p biopsy s/p tattoo distal to mass colonic stent placement.  Biopsy showed: Colon, splenic flexure, mass, biopsy: - Minute focus of high-grade carcinoma, in a background of marked ulceration / necrosis and fragments of colonic mucosa.  He had resection by Dr. Aguilar S/P robotic splenic flexure colectomy, Path: Final Diagnosis 1. Middle colic lymph node: -  1 lymph node, negative for tumor. 2.  Middle colic the lymphadenectomy: -  12 lymph nodes, negative for tumor. 3.  Fourth portion of duodenum serosa: -  Fibrofatty tissue with mild chronic inflammation. -  Negative for tumor. 4. Splenic flexure colectomy: -  Poorly differentiated, ulcerated adenocarcinoma with mucin production. -  Tumor invades muscle and pericolic tissue, pT3. -  20 lymph nodes are free of tumor. -  All resection margins are negative for tumor. -  Calretinin stain is negative in tumor with rules out medullary carcinoma.  Immunohistochemical studies were performed at [facility], [address] [place] [address], [id number] (see NOTE). Immunohistochemical stains have been performed using antibodies to the following mismatch repair proteins: hMLH1 (clone M1)   Positive hMSH2 (clone [id number])    Positive hMSH6 (clone [id number])     Positive PMS2 (clone [id number])     Positive  Review of his blood work showed borderline mild anemia he had leukocytosis, CMP had some SVITLANA which resolved LFTs were normal his CA 19-9 and CEA were normal as well this is from August 20.  He is here for to discuss adjuvant options. [de-identified] : Adenocarcinoma  [de-identified] : October 15, 2024 Guero is here for follow-up he started capecitabine and is due for cycle 2 tomorrow he has no complications CBC came back demonstrating mild anemia with hemoglobin 11.5 may be from chemotherapy but I ordered iron studies as well today.  11/12/2024 he is on the week off - reports inadequate oral hydration and "tearing eyes".  11/26/2024 Reports feeling well at the baseline of his health status, no changes in chronic conditions or new complaints since the last visit.  12/17/2024 He is here for follow-up and reports no new complaints. He still has problems with cracking of his fingertips and palms, but this is his chronic condition that happens even before we started the treatment. He uses Eucerin cream and it seems helps a lot. But his condition worsens since the temperature is getting to wintertime.  1/9/25: Pt is here for the follow up. He is on Xeloda for stage II Colon cancer. he is tolerating treatment well. Today is his D2 of cycle. we reduced the dose to 1500 mg BD last time, and reports he feels much better now. His  reports "pain in his hands". 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 His weight is stable. Counselled patient on using urea cream more regularly. Denies any abdominal pain, blood in stools, headaches, blurred vision, dizziness. He had CT C/A/P that showed ÁNGEL.   January 27, 2025 He is here for follow-up he is doing well. Hands and feet are a bit better with lower dose. White blood cell count today is 10.5 hemoglobin 12.2 platelets 229 neutrophils 6800 approximately.  02/18/2025 He is here today for follow-up and is very excited to start his last cycle of capecitabine. He is feeling and doing well. He continues to work.  03/19/2025 He is here for follow-up and feeling and doing well. He completed 6 months of capecitabine and his hands are doing tiny better.  06/18/2025 Reports feeling well at the baseline of his health status, no changes in chronic conditions or new complaints since the last visit. Reports good psychosocial support at home.

## 2025-06-24 NOTE — PHYSICAL EXAM
[Fully active, able to carry on all pre-disease performance without restriction] : Status 0 - Fully active, able to carry on all pre-disease performance without restriction [Normal] : normoactive bowel sounds, soft and nontender, no hepatosplenomegaly or masses appreciated [de-identified] : well-healed surgical scars [de-identified] : Chronic cracking at his fingertips and palms bilateral - Unchanged.

## 2025-06-24 NOTE — ASSESSMENT
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 - 20 lymph nodes removed the tumor was poorly differentiated as well as there was evidence of obstruction. - Other high-risk features such as LVI or PNI were not reported. - I had an extensive discussion about the role of adjuvant chemotherapy and stage II colon cancer I explained that at most adjuvant chemotherapy improve survival by about 2 to 4%. He does have high risk clinical features granted these alone does not guarantee that there will be benefit from adjuvant chemotherapy. - We went over the following options which include observation, adjuvant chemotherapy with capecitabine for 6 months, I explained the uncertain role of oxaliplatin and stage II colon cancer and that there is no definitive data that did improve survival, another option is discussed was a clinical trial NRG-. Although this trial is currently not available in [place] it is available and multiple [facility] centers including [facility]. I also discussed the role of minimal residual disease testing with circulating tumor DNA analysis to guide therapy. N Engl J Med 2022;386:2374-6131 DOI: 10.1056/[id number] and the mentation of this technology was also discussed with patient. - I went over the side effects of capecitabine in detail including but not limited to nausea, vomiting, rashes, diarrhea, cytopenia and severe toxicity such as mucositis and pancytopenia if his DPD deficiency. - After an extensive discussion he favored to start capecitabine for 6 months and also wanted to obtain Signatera testing if the result is positive, we would escalate treatment and add oxaliplatin and complete 6 months of total treatment. - Signatera was negative. - 09/18/2024 - 03/03/2025 completed single agent Capecitabine 1000 mg/m2 = 2000 mg BID D1-14 Q21D for total of 6 months. - 12/17/2024 Capecitabine dose decreased to 1500 mg PO D1-14 Q21D due exacerbation of hand-foot syndrome. - 12/31/2024 CT C/A/P - ÁNGEL.  06/18/2025 Labs reviewed and results discussed with the patient; no pain reported - remains clinically stable to continue current management. All questions were answered to satisfaction.  PLAN: - continue with surveillance. - repeat colonoscopy around 09/2025. - repeat CT C/A/P in approximately 1-year - 12/2025. - Labs today: CBC CMP CEA - repeat CEA every 3 months - next 09/2025. RTC in 3 months CBC CMP CEA